# Patient Record
Sex: MALE | Race: ASIAN | Employment: UNEMPLOYED | URBAN - METROPOLITAN AREA
[De-identification: names, ages, dates, MRNs, and addresses within clinical notes are randomized per-mention and may not be internally consistent; named-entity substitution may affect disease eponyms.]

---

## 2023-01-31 ENCOUNTER — LAB (OUTPATIENT)
Dept: FAMILY MEDICINE | Facility: OTHER | Age: 53
End: 2023-01-31

## 2023-01-31 ENCOUNTER — OFFICE VISIT (OUTPATIENT)
Dept: FAMILY MEDICINE | Facility: OTHER | Age: 53
End: 2023-01-31
Payer: COMMERCIAL

## 2023-01-31 VITALS
SYSTOLIC BLOOD PRESSURE: 132 MMHG | HEIGHT: 66 IN | RESPIRATION RATE: 18 BRPM | DIASTOLIC BLOOD PRESSURE: 82 MMHG | HEART RATE: 78 BPM | OXYGEN SATURATION: 97 % | TEMPERATURE: 98.2 F | WEIGHT: 185.5 LBS | BODY MASS INDEX: 29.81 KG/M2

## 2023-01-31 DIAGNOSIS — Z12.5 SCREENING FOR PROSTATE CANCER: ICD-10-CM

## 2023-01-31 DIAGNOSIS — Z12.11 COLON CANCER SCREENING: ICD-10-CM

## 2023-01-31 DIAGNOSIS — B35.4 TINEA CORPORIS: ICD-10-CM

## 2023-01-31 DIAGNOSIS — Z00.00 ROUTINE GENERAL MEDICAL EXAMINATION AT A HEALTH CARE FACILITY: Primary | ICD-10-CM

## 2023-01-31 DIAGNOSIS — L82.1 SEBORRHEIC KERATOSIS: ICD-10-CM

## 2023-01-31 LAB
ALBUMIN SERPL BCG-MCNC: 4.6 G/DL (ref 3.5–5.2)
ALP SERPL-CCNC: 80 U/L (ref 40–129)
ALT SERPL W P-5'-P-CCNC: 12 U/L (ref 10–50)
ANION GAP SERPL CALCULATED.3IONS-SCNC: 7 MMOL/L (ref 7–15)
AST SERPL W P-5'-P-CCNC: 32 U/L (ref 10–50)
BASOPHILS # BLD AUTO: 0 10E3/UL (ref 0–0.2)
BASOPHILS NFR BLD AUTO: 0 %
BILIRUB SERPL-MCNC: 0.4 MG/DL
BUN SERPL-MCNC: 11.4 MG/DL (ref 6–20)
CALCIUM SERPL-MCNC: 9.6 MG/DL (ref 8.6–10)
CHLORIDE SERPL-SCNC: 101 MMOL/L (ref 98–107)
CHOLEST SERPL-MCNC: 262 MG/DL
CREAT SERPL-MCNC: 0.92 MG/DL (ref 0.67–1.17)
DEPRECATED HCO3 PLAS-SCNC: 31 MMOL/L (ref 22–29)
EOSINOPHIL # BLD AUTO: 0.1 10E3/UL (ref 0–0.7)
EOSINOPHIL NFR BLD AUTO: 1 %
ERYTHROCYTE [DISTWIDTH] IN BLOOD BY AUTOMATED COUNT: 13 % (ref 10–15)
GFR SERPL CREATININE-BSD FRML MDRD: >90 ML/MIN/1.73M2
GLUCOSE SERPL-MCNC: 113 MG/DL (ref 70–99)
HCT VFR BLD AUTO: 42.2 % (ref 40–53)
HDLC SERPL-MCNC: 80 MG/DL
HGB BLD-MCNC: 13.8 G/DL (ref 13.3–17.7)
LDLC SERPL CALC-MCNC: 160 MG/DL
LYMPHOCYTES # BLD AUTO: 1.1 10E3/UL (ref 0.8–5.3)
LYMPHOCYTES NFR BLD AUTO: 25 %
MCH RBC QN AUTO: 30.9 PG (ref 26.5–33)
MCHC RBC AUTO-ENTMCNC: 32.7 G/DL (ref 31.5–36.5)
MCV RBC AUTO: 95 FL (ref 78–100)
MONOCYTES # BLD AUTO: 0.5 10E3/UL (ref 0–1.3)
MONOCYTES NFR BLD AUTO: 11 %
NEUTROPHILS # BLD AUTO: 2.8 10E3/UL (ref 1.6–8.3)
NEUTROPHILS NFR BLD AUTO: 63 %
NONHDLC SERPL-MCNC: 182 MG/DL
PLATELET # BLD AUTO: 226 10E3/UL (ref 150–450)
POTASSIUM SERPL-SCNC: 4 MMOL/L (ref 3.4–5.3)
PROT SERPL-MCNC: 8.1 G/DL (ref 6.4–8.3)
PSA SERPL-MCNC: 0.73 NG/ML (ref 0–3.5)
RBC # BLD AUTO: 4.46 10E6/UL (ref 4.4–5.9)
SODIUM SERPL-SCNC: 139 MMOL/L (ref 136–145)
TRIGL SERPL-MCNC: 109 MG/DL
WBC # BLD AUTO: 4.5 10E3/UL (ref 4–11)

## 2023-01-31 PROCEDURE — 80053 COMPREHEN METABOLIC PANEL: CPT | Performed by: PHYSICIAN ASSISTANT

## 2023-01-31 PROCEDURE — G0103 PSA SCREENING: HCPCS | Performed by: PHYSICIAN ASSISTANT

## 2023-01-31 PROCEDURE — 99386 PREV VISIT NEW AGE 40-64: CPT | Performed by: PHYSICIAN ASSISTANT

## 2023-01-31 PROCEDURE — 85025 COMPLETE CBC W/AUTO DIFF WBC: CPT | Performed by: PHYSICIAN ASSISTANT

## 2023-01-31 PROCEDURE — 80061 LIPID PANEL: CPT | Performed by: PHYSICIAN ASSISTANT

## 2023-01-31 PROCEDURE — 36415 COLL VENOUS BLD VENIPUNCTURE: CPT | Performed by: PHYSICIAN ASSISTANT

## 2023-01-31 RX ORDER — CLOTRIMAZOLE 1 %
CREAM (GRAM) TOPICAL 2 TIMES DAILY
Qty: 60 G | Refills: 0 | Status: SHIPPED | OUTPATIENT
Start: 2023-01-31

## 2023-01-31 ASSESSMENT — ENCOUNTER SYMPTOMS
SHORTNESS OF BREATH: 1
WEAKNESS: 1
HEARTBURN: 0
DIZZINESS: 0
EYE PAIN: 0
ABDOMINAL PAIN: 0
ARTHRALGIAS: 0
DYSURIA: 0
MYALGIAS: 0
JOINT SWELLING: 0
HEMATURIA: 0
CONSTIPATION: 0
COUGH: 0
FEVER: 0
FREQUENCY: 0
SORE THROAT: 0
HEADACHES: 0
PALPITATIONS: 0
NAUSEA: 0
DIARRHEA: 0
NERVOUS/ANXIOUS: 0
PARESTHESIAS: 0
HEMATOCHEZIA: 0
CHILLS: 0

## 2023-01-31 ASSESSMENT — PAIN SCALES - GENERAL: PAINLEVEL: NO PAIN (0)

## 2023-01-31 NOTE — PROGRESS NOTES
SUBJECTIVE:   CC: rTish is an 52 year old who presents for preventative health visit.     Patient has been advised of split billing requirements and indicates understanding: Yes  Healthy Habits:     Getting at least 3 servings of Calcium per day:  Yes    Bi-annual eye exam:  NO    Dental care twice a year:  Yes    Sleep apnea or symptoms of sleep apnea:  None    Diet:  Other    Frequency of exercise:  2-3 days/week    Duration of exercise:  30-45 minutes    Taking medications regularly:  Yes    Medication side effects:  None    PHQ-2 Total Score: 0    Additional concerns today:  No    Vision is good, but difficult with reading  Hearing is doing well   Diet is health oriented, balanced     Today's PHQ-2 Score:   PHQ-2 ( 1999 [employer]) 1/31/2023   Q1: Little interest or pleasure in doing things 0   Q2: Feeling down, depressed or hopeless 0   PHQ-2 Score 0   Q1: Little interest or pleasure in doing things Not at all   Q2: Feeling down, depressed or hopeless Not at all   PHQ-2 Score 0     Social History     Tobacco Use     Smoking status: Not on file     Smokeless tobacco: Not on file   Substance Use Topics     Alcohol use: Not on file     If you drink alcohol do you typically have >3 drinks per day or >7 drinks per week? Not applicable    Alcohol Use 1/31/2023   Prescreen: >3 drinks/day or >7 drinks/week? Not Applicable     Last PSA: No results found for: PSA    Reviewed orders with patient. Reviewed health maintenance and updated orders accordingly - Yes      Reviewed and updated as needed this visit by clinical staff                  Reviewed and updated as needed this visit by Provider                     Review of Systems   Constitutional: Negative for chills and fever.   HENT: Negative for congestion, ear pain, hearing loss and sore throat.    Eyes: Negative for pain and visual disturbance.   Respiratory: Positive for shortness of breath. Negative for cough.    Cardiovascular: Negative for chest pain,  "palpitations and peripheral edema.   Gastrointestinal: Negative for abdominal pain, constipation, diarrhea, heartburn, hematochezia and nausea.   Genitourinary: Negative for dysuria, frequency, genital sores, hematuria, impotence, penile discharge and urgency.   Musculoskeletal: Negative for arthralgias, joint swelling and myalgias.   Skin: Negative for rash.   Neurological: Positive for weakness. Negative for dizziness, headaches and paresthesias.   Psychiatric/Behavioral: Negative for mood changes. The patient is not nervous/anxious.      OBJECTIVE:   /82 (Cuff Size: Adult Regular)   Pulse 78   Temp 98.2  F (36.8  C) (Temporal)   Resp 18   Ht 1.683 m (5' 6.25\")   Wt 84.1 kg (185 lb 8 oz)   SpO2 97%   BMI 29.71 kg/m      Physical Exam  GENERAL: healthy, alert and no distress  EYES: Eyes grossly normal to inspection, PERRL and conjunctivae and sclerae normal  HENT: ear canals and TM's normal, nose and mouth without ulcers or lesions  NECK: no adenopathy, no asymmetry, masses, or scars and thyroid normal to palpation  RESP: lungs clear to auscultation - no rales, rhonchi or wheezes  CV: regular rate and rhythm, normal S1 S2, no S3 or S4, no murmur, click or rub, no peripheral edema and peripheral pulses strong  ABDOMEN: soft, nontender, no hepatosplenomegaly, no masses and bowel sounds normal  MS: no gross musculoskeletal defects noted, no edema  SKIN: small seborrheic keratosis along the left dorsal forearm, area of tinea infection along the left upper forearm  PSYCH: mentation appears normal, affect normal/bright    Diagnostic Test Results:  Results for orders placed or performed in visit on 01/31/23   PSA, screen     Status: Normal   Result Value Ref Range    Prostate Specific Antigen Screen 0.73 0.00 - 3.50 ng/mL    Narrative    This result is obtained using the Roche Elecsys total PSA method on the caridad e601 immunoassay analyzer. Results obtained with different assay methods or kits cannot be used " interchangeably.   Lipid Profile (Chol, Trig, HDL, LDL calc)     Status: Abnormal   Result Value Ref Range    Cholesterol 262 (H) <200 mg/dL    Triglycerides 109 <150 mg/dL    Direct Measure HDL 80 >=40 mg/dL    LDL Cholesterol Calculated 160 (H) <=100 mg/dL    Non HDL Cholesterol 182 (H) <130 mg/dL    Narrative    Cholesterol  Desirable:  <200 mg/dL    Triglycerides  Normal:  Less than 150 mg/dL  Borderline High:  150-199 mg/dL  High:  200-499 mg/dL  Very High:  Greater than or equal to 500 mg/dL    Direct Measure HDL  Female:  Greater than or equal to 50 mg/dL   Male:  Greater than or equal to 40 mg/dL    LDL Cholesterol  Desirable:  <100mg/dL  Above Desirable:  100-129 mg/dL   Borderline High:  130-159 mg/dL   High:  160-189 mg/dL   Very High:  >= 190 mg/dL    Non HDL Cholesterol  Desirable:  130 mg/dL  Above Desirable:  130-159 mg/dL  Borderline High:  160-189 mg/dL  High:  190-219 mg/dL  Very High:  Greater than or equal to 220 mg/dL   Comprehensive metabolic panel (BMP + Alb, Alk Phos, ALT, AST, Total. Bili, TP)     Status: Abnormal   Result Value Ref Range    Sodium 139 136 - 145 mmol/L    Potassium 4.0 3.4 - 5.3 mmol/L    Chloride 101 98 - 107 mmol/L    Carbon Dioxide (CO2) 31 (H) 22 - 29 mmol/L    Anion Gap 7 7 - 15 mmol/L    Urea Nitrogen 11.4 6.0 - 20.0 mg/dL    Creatinine 0.92 0.67 - 1.17 mg/dL    Calcium 9.6 8.6 - 10.0 mg/dL    Glucose 113 (H) 70 - 99 mg/dL    Alkaline Phosphatase 80 40 - 129 U/L    AST 32 10 - 50 U/L    ALT 12 10 - 50 U/L    Protein Total 8.1 6.4 - 8.3 g/dL    Albumin 4.6 3.5 - 5.2 g/dL    Bilirubin Total 0.4 <=1.2 mg/dL    GFR Estimate >90 >60 mL/min/1.73m2   CBC with platelets and differential     Status: None   Result Value Ref Range    WBC Count 4.5 4.0 - 11.0 10e3/uL    RBC Count 4.46 4.40 - 5.90 10e6/uL    Hemoglobin 13.8 13.3 - 17.7 g/dL    Hematocrit 42.2 40.0 - 53.0 %    MCV 95 78 - 100 fL    MCH 30.9 26.5 - 33.0 pg    MCHC 32.7 31.5 - 36.5 g/dL    RDW 13.0 10.0 - 15.0 %     Platelet Count 226 150 - 450 10e3/uL    % Neutrophils 63 %    % Lymphocytes 25 %    % Monocytes 11 %    % Eosinophils 1 %    % Basophils 0 %    Absolute Neutrophils 2.8 1.6 - 8.3 10e3/uL    Absolute Lymphocytes 1.1 0.8 - 5.3 10e3/uL    Absolute Monocytes 0.5 0.0 - 1.3 10e3/uL    Absolute Eosinophils 0.1 0.0 - 0.7 10e3/uL    Absolute Basophils 0.0 0.0 - 0.2 10e3/uL   CBC with platelets and differential     Status: None    Narrative    The following orders were created for panel order CBC with platelets and differential.  Procedure                               Abnormality         Status                     ---------                               -----------         ------                     CBC with platelets and d...[[id number]]                      Final result                 Please view results for these tests on the individual orders.       ASSESSMENT/PLAN:       ICD-10-CM    1. Routine general medical examination at a health care facility  Z00.00 CBC with platelets and differential     Comprehensive metabolic panel (BMP + Alb, Alk Phos, ALT, AST, Total. Bili, TP)     Lipid Profile (Chol, Trig, HDL, LDL calc)     Lipid Profile (Chol, Trig, HDL, LDL calc)     Comprehensive metabolic panel (BMP + Alb, Alk Phos, ALT, AST, Total. Bili, TP)     CBC with platelets and differential      2. Tinea corporis  B35.4 clotrimazole (LOTRIMIN) 1 % external cream      3. Seborrheic keratosis  L82.1       4. Screening for prostate cancer  Z12.5 PSA, screen     PSA, screen      5. Colon cancer screening  Z12.11 COLOGARNOLD([employer])          Patient has been advised of split billing requirements and indicates understanding: Yes      COUNSELING:   Reviewed preventive health counseling, as reflected in patient instructions       Regular exercise       Healthy diet/nutrition       Colorectal cancer screening    [facility]

## 2023-02-01 ENCOUNTER — TELEPHONE (OUTPATIENT)
Dept: FAMILY MEDICINE | Facility: OTHER | Age: 53
End: 2023-02-01
Payer: COMMERCIAL

## 2023-02-01 NOTE — LETTER
Hello,        Your lipids (cholesterol) levels returned with elevations. Of these, the low density lipoprotein (LDL) was elevated at 160. The ASCVD risk is estimated at 4.0%. This is the estimated risk for stroke and or heart attack over the next ten years based off of your age, gender, habits and lipids (see values below). The normal level is considered to be 5% or less. Recommendations to help keep this risk low include: Maintaining a healthy diet low in salts/sugars/fatty&greasy foods with regular servings of fruits and vegetables. Smoking cessation. Also, try to get in 30 minutes of aerobic exercise 5 or more days each week as able.     The metabolic panel, which evaluates your kidney and liver function, electrolytes and blood sugar, returned grossly normal. In addition, the cell counts did not show any evidence of anemia, infection or other abnormality.     If you have any questions or concerns please let us know.     Thank you,    [facility] Team  [phone]                                                Office Visit on 01/31/2023   Component Date Value Ref Range Status     Prostate Specific Antigen Screen 01/31/2023 0.73  0.00 - 3.50 ng/mL Final     Cholesterol 01/31/2023 262 (H)  <200 mg/dL Final     Triglycerides 01/31/2023 109  <150 mg/dL Final     Direct Measure HDL 01/31/2023 80  >=40 mg/dL Final     LDL Cholesterol Calculated 01/31/2023 160 (H)  <=100 mg/dL Final     Non HDL Cholesterol 01/31/2023 182 (H)  <130 mg/dL Final     Sodium 01/31/2023 139  136 - 145 mmol/L Final     Potassium 01/31/2023 4.0  3.4 - 5.3 mmol/L Final     Chloride 01/31/2023 101  98 - 107 mmol/L Final     Carbon Dioxide (CO2) 01/31/2023 31 (H)  22 - 29 mmol/L Final     Anion Gap 01/31/2023 7  7 - 15 mmol/L Final     Urea Nitrogen 01/31/2023 11.4  6.0 - 20.0 mg/dL Final     Creatinine 01/31/2023 0.92  0.67 - 1.17 mg/dL Final     Calcium 01/31/2023 9.6  8.6 - 10.0 mg/dL Final     Glucose 01/31/2023 113 (H)   70 - 99 mg/dL Final     Alkaline Phosphatase 01/31/2023 80  40 - 129 U/L Final     AST 01/31/2023 32  10 - 50 U/L Final     ALT 01/31/2023 12  10 - 50 U/L Final     Protein Total 01/31/2023 8.1  6.4 - 8.3 g/dL Final     Albumin 01/31/2023 4.6  3.5 - 5.2 g/dL Final     Bilirubin Total 01/31/2023 0.4  <=1.2 mg/dL Final     GFR Estimate 01/31/2023 >90  >60 mL/min/1.73m2 Final    eGFR calculated using 2021 CKD-EPI equation.     WBC Count 01/31/2023 4.5  4.0 - 11.0 10e3/uL Final     RBC Count 01/31/2023 4.46  4.40 - 5.90 10e6/uL Final     Hemoglobin 01/31/2023 13.8  13.3 - 17.7 g/dL Final     Hematocrit 01/31/2023 42.2  40.0 - 53.0 % Final     MCV 01/31/2023 95  78 - 100 fL Final     MCH 01/31/2023 30.9  26.5 - 33.0 pg Final     MCHC 01/31/2023 32.7  31.5 - 36.5 g/dL Final     RDW 01/31/2023 13.0  10.0 - 15.0 % Final     Platelet Count 01/31/2023 226  150 - 450 10e3/uL Final     % Neutrophils 01/31/2023 63  % Final     % Lymphocytes 01/31/2023 25  % Final     % Monocytes 01/31/2023 11  % Final     % Eosinophils 01/31/2023 1  % Final     % Basophils 01/31/2023 0  % Final     Absolute Neutrophils 01/31/2023 2.8  1.6 - 8.3 10e3/uL Final     Absolute Lymphocytes 01/31/2023 1.1  0.8 - 5.3 10e3/uL Final     Absolute Monocytes 01/31/2023 0.5  0.0 - 1.3 10e3/uL Final     Absolute Eosinophils 01/31/2023 0.1  0.0 - 0.7 10e3/uL Final     Absolute Basophils 01/31/2023 0.0  0.0 - 0.2 10e3/uL Final

## 2023-02-01 NOTE — TELEPHONE ENCOUNTER
----- Message from Carroll Fernandez PA-C sent at 1/31/2023  8:58 PM CST -----  Required  during visit.     Please call with results. Please inform patient that his PSA returned within normal range.     His lipids (cholesterol) levels returned with elevations. Of these, the low density lipoprotein (LDL) was elevated at 160. The ASCVD risk is estimated at 4.0%. This is the estimated risk for stroke and or heart attack over the next ten years based off of your age, gender, habits and lipids (see values below). The normal level is considered to be 5% or less. Recommendations to help keep this risk low include: Maintaining a healthy diet low in salts/sugars/fatty&greasy foods with regular servings of fruits and vegetables. Smoking cessation. Also, try to get in 30 minutes of aerobic exercise 5 or more days each week as able.    The metabolic panel, which evaluates your kidney and liver function, electrolytes and blood sugar, returned grossly normal. In addition, the cell counts did not show any evidence of anemia, infection or other abnormality.     Follow up in 1 year for next annual checkup.        Carroll Fernandez PA-C on 1/31/2023 at 8:50 PM

## 2023-02-01 NOTE — TELEPHONE ENCOUNTER
Attempted to reach the patient with the following information.  Left message for patient to return call to clinic.     Fiordaliza Pace MA

## 2023-02-01 NOTE — TELEPHONE ENCOUNTER
Sister calls back. Pt is not with her. There is no consent to communicate on file. Sister asks that you mail results to pt and also put her name.     Moon Howell, ETHELN, RN, PHN  Registered Nurse-Clinic Triage  [facility]  2/1/2023 at 2:04 PM

## 2023-03-10 ENCOUNTER — TELEPHONE (OUTPATIENT)
Dept: FAMILY MEDICINE | Facility: OTHER | Age: 53
End: 2023-03-10
Payer: COMMERCIAL

## 2023-03-10 LAB — NONINV COLON CA DNA+OCC BLD SCRN STL QL: NEGATIVE

## 2023-03-10 NOTE — TELEPHONE ENCOUNTER
----- Message from Carroll Fernandez PA-C sent at 3/10/2023 12:34 PM CST -----  Please send letter:    Dear Sinanlaureen,    The results of your cologuard returned negative. A negative Cologuard result indicates a low likelihood that a colorectal cancer (CRC) or advanced adenoma (adenomatous polyps with more advanced pre-malignant features) is present. The chance that a person with a negative Cologuard test has a colorectal cancer is less than 1 in 1500 (negative predictive value >99.9%) or has an  advanced adenoma is less than  5.3% (negative predictive value 94.7%). Multi-Society Task Force screening guidelines recommend a Cologuard re-screening interval of 3 years.     Carroll Fernandez PA-C

## 2024-01-25 ENCOUNTER — TELEPHONE (OUTPATIENT)
Dept: FAMILY MEDICINE | Facility: OTHER | Age: 54
End: 2024-01-25
Payer: COMMERCIAL

## 2024-01-26 ENCOUNTER — ANCILLARY PROCEDURE (OUTPATIENT)
Dept: GENERAL RADIOLOGY | Facility: CLINIC | Age: 54
End: 2024-01-26
Payer: COMMERCIAL

## 2024-01-26 ENCOUNTER — OFFICE VISIT (OUTPATIENT)
Dept: URGENT CARE | Facility: URGENT CARE | Age: 54
End: 2024-01-26
Payer: COMMERCIAL

## 2024-01-26 VITALS
SYSTOLIC BLOOD PRESSURE: 146 MMHG | HEART RATE: 75 BPM | OXYGEN SATURATION: 97 % | DIASTOLIC BLOOD PRESSURE: 84 MMHG | TEMPERATURE: 97 F

## 2024-01-26 DIAGNOSIS — M25.512 ACUTE PAIN OF LEFT SHOULDER: ICD-10-CM

## 2024-01-26 DIAGNOSIS — S43.402A SPRAIN OF LEFT SHOULDER, UNSPECIFIED SHOULDER SPRAIN TYPE, INITIAL ENCOUNTER: Primary | ICD-10-CM

## 2024-01-26 PROCEDURE — 99203 OFFICE O/P NEW LOW 30 MIN: CPT

## 2024-01-26 PROCEDURE — 73030 X-RAY EXAM OF SHOULDER: CPT | Mod: TC | Performed by: RADIOLOGY

## 2024-01-26 ASSESSMENT — ENCOUNTER SYMPTOMS
RESPIRATORY NEGATIVE: 1
GASTROINTESTINAL NEGATIVE: 1
ARTHRALGIAS: 1
CONSTITUTIONAL NEGATIVE: 1
CARDIOVASCULAR NEGATIVE: 1
PSYCHIATRIC NEGATIVE: 1
NEUROLOGICAL NEGATIVE: 1

## 2024-01-26 NOTE — TELEPHONE ENCOUNTER
Reason for Call:  Appointment Request- New patient looking to see if someone could see him regarding a shoulder injury. A few months he injured it pulling cord on  and it has not got better. Did not want triage. Cousin Jack is calling for him, and she is not with the patient right now. Needs a [facility] . Can a provider fit him in at [facility]?  Patient last seen 1/31/23  Patient requesting this type of appt:  Office Visit for Shoulder injury     Requested provider:  Any [facility] provider     Reason patient unable to be scheduled: Not within requested timeframe    When does patient want to be seen/preferred time: Same day or asap     Comments: None     Okay to leave a detailed message?: Yes at Other phone number:  Call patients colemansin Jack, she speaks English Her phone number is [phone]. Can leave a message. Thank you    Call taken on 1/25/2024 at 9:07 PM by Suha Martinez

## 2024-01-26 NOTE — TELEPHONE ENCOUNTER
Attempted to call patient with .  Unable to leave message on  due to busy signal.  No C2C on file for patient's cousin.

## 2024-01-27 NOTE — PROGRESS NOTES
Patient presents with:  Urgent Care  Shoulder Pain: X's 2 months left shoulder pain       Clinical Decision Makin-year-old male, well, nontoxic, nonseptic appearing presenting with cousin.  Poorly localized left shoulder pain x 2 months which started after trying to pull a lawnmower to start.  Has tried conservative measures at home including topical ointments and quarter rubbing/massage, without relief.  Exam is reassuringly without evidence of joint instability or deformity, no point tenderness, no obvious swelling or effusion. Remarkable for positive painful arc sign, positive Neer's. Shoulder sprain vs rotator cuff injury.      X-ray left shoulder reassuringly unremarkable.    Recommended trying over-the-counter NSAIDs such as ibuprofen.  Patient expresses he prefers not to take pills.  Therefore, recommended topical Voltaren.  Also recommended follow-up with sports medicine as left shoulder pain is persisting despite conservative measures he has tried at home.    At the end of the encounter, I discussed results, diagnosis, medications. Discussed red flags for immediate return to clinic/ER, as well as indications for follow up if no improvement. Patient understood and agreed to plan. Patient was stable for discharge.    ICD-10-CM    1. Sprain of left shoulder, unspecified shoulder sprain type, initial encounter  S43.402A diclofenac (VOLTAREN) 1 % topical gel      2. Acute pain of left shoulder  M25.512 XR Shoulder Left G/E 3 Views     Orthopedic  Referral          Patient Instructions   X-ray of left shoulder is negative for fracture.    I recommend you try over-the-counter ibuprofen as needed to help decrease inflammation, and to help with discomfort.    Ibuprofen dosin mg to 400 mg every 4-6 hours as needed, or 600 mg to 800 mg every 6-8 hours as needed. Do not consume more than 3200 mg within a 24 hour period.     If you do not want to take something by mouth, you can try the prescription  I sent to the pharmacy.  Voltaren gel.  Apply 4 times daily as needed to the left shoulder region.    As this has been bothering you for 2 months now, not relieved by conservative measures you have tried at home, I do recommend he follow-up with sports medicine and I have placed a referral for this.  They should call you to set up an appointment.  If they do not call you, you can call the number listed.    Please monitor for fevers/chills, weakness, numbness/tingling, chest pain, sweating, GI upset such as vomiting -Go to emergency room if you are experiencing any of these symptoms.      HPI:  Yvonne Day is a 53 year old male who is presenting with cousin. Without significant pmh, who presents today with:  Left shoulder pain x 2 months after trying to pull  on.   Denies numbness/tingling in extremity.  Denies left arm weakness.  Feels pain is worsening.   Has tried ointments, massage, quarter massage, bengay, without improvement.  Has not tried ibuprofen or tylenol.  His cousin reports he does not like taking pills.  No fevers or chills.   No N/V.  Denies chest pain.  Denies shortness of breath.  Otherwise feeling okay.     History obtained from the patient.    Problem List:  There are no relevant problems documented for this patient.      No past medical history on file.    Social History     Tobacco Use    Smoking status: Not on file    Smokeless tobacco: Not on file   Substance Use Topics    Alcohol use: Not on file       Review of Systems   Constitutional: Negative.    HENT: Negative.     Respiratory: Negative.     Cardiovascular: Negative.    Gastrointestinal: Negative.    Musculoskeletal:  Positive for arthralgias.        Shoulder pain, left side.   Neurological: Negative.    Psychiatric/Behavioral: Negative.         Vitals:    01/26/24 1902   BP: (!) 146/84   Pulse: 75   Temp: 97  F (36.1  C)   TempSrc: Temporal   SpO2: 97%       Physical Exam  Constitutional:       General: He is not in  acute distress.     Appearance: Normal appearance.   HENT:      Head: Normocephalic and atraumatic.      Right Ear: External ear normal.      Left Ear: External ear normal.   Eyes:      Conjunctiva/sclera: Conjunctivae normal.   Cardiovascular:      Rate and Rhythm: Normal rate and regular rhythm.      Heart sounds: Normal heart sounds. No murmur heard.     No friction rub. No gallop.   Pulmonary:      Effort: Pulmonary effort is normal. No respiratory distress.      Breath sounds: Normal breath sounds. No stridor. No wheezing, rhonchi or rales.   Chest:      Chest wall: No tenderness.   Musculoskeletal:         General: No swelling, deformity or signs of injury.      Right shoulder: Normal.        Arms:       Right lower leg: No edema.      Left lower leg: No edema.      Comments: Linear ecchymosis, quarter rubbing markings.  Negative empty can.  Seems to have more discomfort with abduction and adduction.  Positive painful arc sign -left side.  Positive Neer's.  Negative Ray Grant.  Negative painful drop arm sign.   Skin:     General: Skin is warm.      Capillary Refill: Capillary refill takes less than 2 seconds.      Comments: Quarter rubbing marks noted on left upper back -linear, ecchymosis marks.    Neurological:      General: No focal deficit present.      Mental Status: He is alert and oriented to person, place, and time.   Psychiatric:         Mood and Affect: Mood normal.         Behavior: Behavior normal.         Thought Content: Thought content normal.         Judgment: Judgment normal.         Results:  Results for orders placed or performed in visit on 01/26/24   XR Shoulder Left G/E 3 Views     Status: None    Narrative    EXAM: XR SHOULDER LEFT G/E 3 VIEWS  LOCATION: [facility]  DATE: 1/26/2024    INDICATION: left shoulder pain x 2 months after a pulling mechanism injury   pulling lawnmower on. No point tenderness.  COMPARISON: None.      Impression    IMPRESSION: Normal  joint spaces and alignment. No fracture.

## 2024-01-27 NOTE — PATIENT INSTRUCTIONS
X-ray of left shoulder is negative for fracture.    I recommend you try over-the-counter ibuprofen as needed to help decrease inflammation, and to help with discomfort.    Ibuprofen dosin mg to 400 mg every 4-6 hours as needed, or 600 mg to 800 mg every 6-8 hours as needed. Do not consume more than 3200 mg within a 24 hour period.     If you do not want to take something by mouth, you can try the prescription I sent to the pharmacy.  Voltaren gel.  Apply 4 times daily as needed to the left shoulder region.    As this has been bothering you for 2 months now, not relieved by conservative measures you have tried at home, I do recommend he follow-up with sports medicine and I have placed a referral for this.  They should call you to set up an appointment.  If they do not call you, you can call the number listed.    Please monitor for fevers/chills, weakness, numbness/tingling, chest pain, sweating, GI upset such as vomiting -Go to emergency room if you are experiencing any of these symptoms.

## 2024-02-09 NOTE — PROGRESS NOTES
Yvonne Day  :  1970  DOS: 2024  MRN: [id number]  PCP: No Ref-Primary, Physician    Sports Medicine Clinic Visit    HPI  Yvonne Day is a 53 year old male who is seen in consultation at the request of  Cathleen Rubio C.N.P. presenting with left shoulder pain.    - Mechanism of Injury:  2+ months ago, pulling injury from starting a   - Prior evaluation:    - UC visit on 24. Suspected RTC injury vs shoulder sprain. Recommended OTC NSAIDs, Voltaren, Sports Med follow up.   - XR L Shoulder shows normal anatomic alignment, no fractures or dislocations.     - Pain Character:  Pain has been present for  3 months .  Pain is well localized to the left shoulder and left scapula without significant radiation down the arm  - Endorses:  pain in the periscapular musculature as well as the lateral shoulder with particular motions, swelling in the periscapular musculature and trapezius  - Denies:  clicking, popping, grinding, instability, numbness, tingling, weakness, fever, chills, nausea, vomiting  - Alleviating factors:  voltaren and biofreeze for short term relief  - Aggravating factors:   lying on his back or left shoulder ; using shoulder away from body  - Treatments tried:  topicals and massage    - Patient Goals:  discuss treatment options      Review of Systems  Musculoskeletal: as above  Remainder of review of systems is negative including constitutional, CV, pulmonary, GI, Skin and Neurologic except as noted in HPI or medical history.    No past medical history on file.  No past surgical history on file.  No family history on file.      Objective  Wt 84.1 kg (185 lb 8 oz)   BMI 29.71 kg/m      General: healthy, alert and in no acute distress.    HEENT: no scleral icterus or conjunctival erythema.   Skin: no suspicious lesions or rash. No jaundice.   CV: regular rhythm by palpation, 2+ distal pulses.  Resp: normal respiratory effort without conversational dyspnea.    Psych: normal mood and affect.    Gait: nonantalgic, appropriate coordination and balance.     Neuro:        - Sensation to light touch:    - Intact throughout the BUE including all peripheral nerve distributions.        - MSR:       RUE  LUE  - Biceps  2+ 2+  - Brachioradialis 2+ 2+  - Triceps  2+ 2+       - Special tests:   - Spurling's:  Neg bilaterally    MSK - Shoulder:       - Inspection:    - No significant swelling, erythema, warmth, ecchymosis, lesion, or atrophy noted.        - ROM:    - Full AROM/PROM with pain during shoulder abduction, flexion, IR/ER.        - Palpation:    - TTP at the subacromial space, posterior shoulder notch, upper trapezius, infraspinatus.  - NTTP elsewhere.        - Strength:  (*antalgic)  RUE LUE  - Shoulder Abduction   5 5*   - Shoulder Flexion   5 5   - Shoulder Internal Rotation  5 5*   - Shoulder External Rotation  5 5*  - Elbow Flexion   5 5  - Elbow Extension   5 5  - Forearm Pronation   5 5  - Forearm Supination   5 5  - Wrist Extension   5 5  - Wrist Flexion    5 5  - FDI     5 5  - ADM     5 5  - FPL     5 5  - APB     5 5  - EIP     5 5  - EDC     5 5  - APL/EPB    5 5           - Special tests:        - Ray: Positive   - Neers: Positive   - Empty can: Positive    - [place]:  Neg    - Scarf:  Neg    - Speeds:  Neg    - Yergason:  Neg     Radiology  I independently reviewed the available relevant imaging, with the following interpretation:   -XR with interpretation as above in HPI      Recent Results (from the past 744 hour(s))   XR Shoulder Left G/E 3 Views    Narrative    EXAM: XR SHOULDER LEFT G/E 3 VIEWS  LOCATION: [facility]  DATE: 1/26/2024    INDICATION: left shoulder pain x 2 months after a pulling mechanism injury   pulling lawnmower on. No point tenderness.  COMPARISON: None.      Impression    IMPRESSION: Normal joint spaces and alignment. No fracture.       Assessment  1. Traumatic incomplete tear of left rotator cuff, initial  encounter        Plan  Yvonne Day is a 53 year old male that presents with chronic left shoulder pain for the past few months after an injury while pull starting a .  Pain has been in the left posterior shoulder/infraspinatus as well as periscapular musculature.  He feels pain with particular lifts and shoulder motions as well as when he is laying on his back.  History and physical exam appear most consistent with a mild to moderate traumatic incomplete rotator cuff tear, most likely in the infraspinatus with myofascial pain in the periscapular musculature.     Discussed the nature of the condition and treatment options and mutually agreed upon the following plan:    - Imaging:          - Reviewed relevant imaging in the chart.  - Reviewed results and images with patient.   - Medications:          - Discussed pharmacologic options for pain relief.   - May use NSAIDs (Ibuprofen, Naproxen) or Acetaminophen (Tylenol) as needed for pain control.   - May also use topical medications such as lidocaine, IcyHot, BioFreeze, or Voltaren gel as needed for pain control.  Added a prescription for Voltaren gel per patient request.  - Injections:          - Discussed possible injection options and alternatives.    - Options include corticosteroid injection of the subacromial/subdeltoid bursa, or could consider trigger point injections in the future.    -Stated that he would like a subacromial injection today, injection was prepared but then he stated that he would prefer the injection be done under ultrasound guidance, which is reasonable.  We coordinated a time later this week to be able to perform the injection with ultrasound guidance at one of our ultrasound capable clinics.  - Therapy:          - Discussed the benefits of physical therapy vs home exercise program for optimization of range of motion, flexibility, strength, stability and function.   - Preference is for physical therapy.   - Physical Therapy  referral placed today and instructed to call [phone] to schedule appointments.   - Modalities:          - May use ice, heat, massage or other modalities as needed.   - Activity:          - Encouraged to remain active and participate in regular activities as symptoms allow.    - Follow up:          - On Friday for an ultrasound-guided left subacromial bursa injection.  - Patient has clinic contact information for questions or concerns.       Carroll Cullen DO, CAQSM  [facility] - Sports Medicine  [facility] - Department of Orthopedic Surgery       Disclaimer:  This note was prepared and written using Dragon Medical dictation software. As a result, there may be errors in the script that have gone undetected. Please consider this when interpreting the information in this note.

## 2024-02-13 ENCOUNTER — OFFICE VISIT (OUTPATIENT)
Dept: ORTHOPEDICS | Facility: OTHER | Age: 54
End: 2024-02-13
Payer: COMMERCIAL

## 2024-02-13 VITALS — BODY MASS INDEX: 29.71 KG/M2 | WEIGHT: 185.5 LBS

## 2024-02-13 DIAGNOSIS — S46.012A TRAUMATIC INCOMPLETE TEAR OF LEFT ROTATOR CUFF, INITIAL ENCOUNTER: Primary | ICD-10-CM

## 2024-02-13 PROCEDURE — 99204 OFFICE O/P NEW MOD 45 MIN: CPT | Performed by: STUDENT IN AN ORGANIZED HEALTH CARE EDUCATION/TRAINING PROGRAM

## 2024-02-13 ASSESSMENT — PAIN SCALES - GENERAL: PAINLEVEL: SEVERE PAIN (6)

## 2024-02-13 NOTE — LETTER
2024         RE: Yvonne Day  [address] [facility] [address]        Dear Colleague,    Thank you for referring your patient, Yvonne Day, to the [facility] SPORTS MEDICINE CLINIC [place]. Please see a copy of my visit note below.    Yvonne Day  :  1970  DOS: 2024  MRN: [id number]  PCP: No Ref-Primary, Physician    Sports Medicine Clinic Visit    HPI  Yvonne Day is a 53 year old male who is seen in consultation at the request of  Cathleen Rubio C.N.P. presenting with left shoulder pain.    - Mechanism of Injury:  2+ months ago, pulling injury from starting a   - Prior evaluation:    - UC visit on 24. Suspected RTC injury vs shoulder sprain. Recommended OTC NSAIDs, Voltaren, Sports Med follow up.   - XR L Shoulder shows normal anatomic alignment, no fractures or dislocations.     - Pain Character:  Pain has been present for  3 months .  Pain is well localized to the left shoulder and left scapula without significant radiation down the arm  - Endorses:  pain in the periscapular musculature as well as the lateral shoulder with particular motions, swelling in the periscapular musculature and trapezius  - Denies:  clicking, popping, grinding, instability, numbness, tingling, weakness, fever, chills, nausea, vomiting  - Alleviating factors:  voltaren and biofreeze for short term relief  - Aggravating factors:   lying on his back or left shoulder ; using shoulder away from body  - Treatments tried:  topicals and massage    - Patient Goals:  discuss treatment options      Review of Systems  Musculoskeletal: as above  Remainder of review of systems is negative including constitutional, CV, pulmonary, GI, Skin and Neurologic except as noted in HPI or medical history.    No past medical history on file.  No past surgical history on file.  No family history on file.      Objective  Wt 84.1 kg (185 lb 8 oz)   BMI 29.71 kg/m       General: healthy, alert and in no acute distress.    HEENT: no scleral icterus or conjunctival erythema.   Skin: no suspicious lesions or rash. No jaundice.   CV: regular rhythm by palpation, 2+ distal pulses.  Resp: normal respiratory effort without conversational dyspnea.   Psych: normal mood and affect.    Gait: nonantalgic, appropriate coordination and balance.     Neuro:        - Sensation to light touch:    - Intact throughout the BUE including all peripheral nerve distributions.        - MSR:       RUE  LUE  - Biceps  2+ 2+  - Brachioradialis 2+ 2+  - Triceps  2+ 2+       - Special tests:   - Spurling's:  Neg bilaterally    MSK - Shoulder:       - Inspection:    - No significant swelling, erythema, warmth, ecchymosis, lesion, or atrophy noted.        - ROM:    - Full AROM/PROM with pain during shoulder abduction, flexion, IR/ER.        - Palpation:    - TTP at the subacromial space, posterior shoulder notch, upper trapezius, infraspinatus.  - NTTP elsewhere.        - Strength:  (*antalgic)  RUE LUE  - Shoulder Abduction   5 5*   - Shoulder Flexion   5 5   - Shoulder Internal Rotation  5 5*   - Shoulder External Rotation  5 5*  - Elbow Flexion   5 5  - Elbow Extension   5 5  - Forearm Pronation   5 5  - Forearm Supination   5 5  - Wrist Extension   5 5  - Wrist Flexion    5 5  - FDI     5 5  - ADM     5 5  - FPL     5 5  - APB     5 5  - EIP     5 5  - EDC     5 5  - APL/EPB    5 5           - Special tests:        - Ray: Positive   - Neers: Positive   - Empty can: Positive    - Scott:  Neg    - Scarf:  Neg    - Speeds:  Neg    - Yergason:  Neg     Radiology  I independently reviewed the available relevant imaging, with the following interpretation:   -XR with interpretation as above in HPI      Recent Results (from the past 744 hour(s))   XR Shoulder Left G/E 3 Views    Narrative    EXAM: XR SHOULDER LEFT G/E 3 VIEWS  LOCATION: [facility]  DATE: 1/26/2024    INDICATION: left  shoulder pain x 2 months after a pulling mechanism injury   pulling lawnmower on. No point tenderness.  COMPARISON: None.      Impression    IMPRESSION: Normal joint spaces and alignment. No fracture.       Assessment  1. Traumatic incomplete tear of left rotator cuff, initial encounter        Plan  Yvonne Day is a 53 year old male that presents with chronic left shoulder pain for the past few months after an injury while pull starting a .  Pain has been in the left posterior shoulder/infraspinatus as well as periscapular musculature.  He feels pain with particular lifts and shoulder motions as well as when he is laying on his back.  History and physical exam appear most consistent with a mild to moderate traumatic incomplete rotator cuff tear, most likely in the infraspinatus with myofascial pain in the periscapular musculature.     Discussed the nature of the condition and treatment options and mutually agreed upon the following plan:    - Imaging:          - Reviewed relevant imaging in the chart.  - Reviewed results and images with patient.   - Medications:          - Discussed pharmacologic options for pain relief.   - May use NSAIDs (Ibuprofen, Naproxen) or Acetaminophen (Tylenol) as needed for pain control.   - May also use topical medications such as lidocaine, IcyHot, BioFreeze, or Voltaren gel as needed for pain control.  Added a prescription for Voltaren gel per patient request.  - Injections:          - Discussed possible injection options and alternatives.    - Options include corticosteroid injection of the subacromial/subdeltoid bursa, or could consider trigger point injections in the future.    -Stated that he would like a subacromial injection today, injection was prepared but then he stated that he would prefer the injection be done under ultrasound guidance, which is reasonable.  We coordinated a time later this week to be able to perform the injection with ultrasound guidance  at one of our ultrasound capable clinics.  - Therapy:          - Discussed the benefits of physical therapy vs home exercise program for optimization of range of motion, flexibility, strength, stability and function.   - Preference is for physical therapy.   - Physical Therapy referral placed today and instructed to call [phone] to schedule appointments.   - Modalities:          - May use ice, heat, massage or other modalities as needed.   - Activity:          - Encouraged to remain active and participate in regular activities as symptoms allow.    - Follow up:          - On Friday for an ultrasound-guided left subacromial bursa injection.  - Patient has clinic contact information for questions or concerns.       Carroll Cullen DO, CAQSM  [facility] Sports Medicine  [facility] - Department of Orthopedic Surgery       Disclaimer:  This note was prepared and written using Dragon Medical dictation software. As a result, there may be errors in the script that have gone undetected. Please consider this when interpreting the information in this note.       Again, thank you for allowing me to participate in the care of your patient.        Sincerely,        Carroll Cullen DO

## 2024-02-15 NOTE — PROGRESS NOTES
Phrasupap Muangprom  :  1970  DOS: 2024  MRN: [id number]    Sports Medicine Clinic Procedure    Ultrasound Guided Left Subacromial Injection    Clinical History: Traumatic incomplete tear of the left rotator cuff    Diagnosis: Traumatic incomplete tear of the left rotator cuff    Large Joint Injection/Arthocentesis: L subacromial bursa    Date/Time: 2024 8:13 AM    Performed by: Carroll Cullen DO  Authorized by: Carroll Cullen DO    Indications:  Pain  Needle Size:  22 G  Guidance: ultrasound    Approach:  Anterolateral  Location:  Shoulder      Site:  L subacromial bursa  Medications:  40 mg triamcinolone 40 MG/ML; 2 mL lidocaine 1 %; 2 mL BUPivacaine (PF) 0.5 %  Outcome:  Tolerated well, no immediate complications  Procedure discussed: discussed risks, benefits, and alternatives    Consent Given by:  Patient  Prep: patient was prepped and draped in usual sterile fashion     Ultrasound images of procedure were permanently stored.       Technique: The risks of the procedure were explained to the patient.  A consent was signed for the subacromial injection.  The patient was evaluated with a [employer] ultrasound machine using a 12 MHz linear probe.     Procedure   Subacromial Bursa - Ultrasound Guided  The patient was informed of the risks and the benefits of the procedure and a written consent was signed.  The patient s left shoulder was prepped with chlorhexidine in sterile fashion.   An injectate solution containing 2 mL of 1% lidocaine, 2 mL of 0.5% Bupivacaine, and 1 mL of Kenalog (40 mg/mL) was drawn up into a 5 mL syringe.  Injection was performed using sterile technique.  Under ultrasound guidance a 1.5-inch 22-gauge needle was used to enter the subacromial bursa.  An anterolateral approach was used with arm held in Crass position.  Needle placement was visualized and documented with ultrasound.  Ultrasound visualization was necessary to ensure placement in to the bursa and not the  rotator cuff tendon which could potentially cause further tendon damage.  Injection performed in-plane.  Injection solution visualized within the joint space.  Images were permanently stored for the patient's record.  There were no complications. The patient tolerated the procedure well. There was negligible bleeding.       Impression:  Successful ultrasound guided left subacromial bursa injection.    Plan:  - Injection:    - Expectations and goals of the injection were discussed and verbal and written consent was obtained.  - Performed a corticosteroid injection of the left subacromial bursa today in clinic. Patient tolerated the procedure well without complications.    - Post-procedure instructions:    - Keep the injection site clean and dry.   - Do not submerge the injection site for 24 hours (no baths, pools). Showers are ok.   - Rest the area for 24-48 hours before resuming normal activities. Avoid overexerting the area for the first few weeks.   - It may take 2-3 days to start noticing the effects of the injection and up to 3-4 weeks to feel significant benefits.   - Follow up:          - In 3 months as needed for updates to treatment plan, or sooner for new/worsening symptoms.  - Patient has clinic contact information for questions or concerns.      Carroll Cullen DO, RODRÍGUEZM  [facility] - Sports Medicine  [facility] - Department of Orthopedic Surgery     Disclaimer:  This note was prepared and written using Dragon Medical dictation software. As a result, there may be errors in the script that have gone undetected. Please consider this when interpreting the information in this note.

## 2024-02-16 ENCOUNTER — OFFICE VISIT (OUTPATIENT)
Dept: ORTHOPEDICS | Facility: CLINIC | Age: 54
End: 2024-02-16
Payer: COMMERCIAL

## 2024-02-16 DIAGNOSIS — S46.012A TRAUMATIC INCOMPLETE TEAR OF LEFT ROTATOR CUFF, INITIAL ENCOUNTER: Primary | ICD-10-CM

## 2024-02-16 PROCEDURE — 20611 DRAIN/INJ JOINT/BURSA W/US: CPT | Mod: LT | Performed by: STUDENT IN AN ORGANIZED HEALTH CARE EDUCATION/TRAINING PROGRAM

## 2024-02-16 PROCEDURE — 99207 PR DROP WITH A PROCEDURE: CPT | Mod: 25 | Performed by: STUDENT IN AN ORGANIZED HEALTH CARE EDUCATION/TRAINING PROGRAM

## 2024-02-16 RX ORDER — LIDOCAINE HYDROCHLORIDE 10 MG/ML
2 INJECTION, SOLUTION INFILTRATION; PERINEURAL
Status: SHIPPED | OUTPATIENT
Start: 2024-02-16

## 2024-02-16 RX ORDER — TRIAMCINOLONE ACETONIDE 40 MG/ML
40 INJECTION, SUSPENSION INTRA-ARTICULAR; INTRAMUSCULAR
Status: SHIPPED | OUTPATIENT
Start: 2024-02-16

## 2024-02-16 RX ORDER — BUPIVACAINE HYDROCHLORIDE 5 MG/ML
2 INJECTION, SOLUTION EPIDURAL; INTRACAUDAL
Status: SHIPPED | OUTPATIENT
Start: 2024-02-16

## 2024-02-16 RX ADMIN — TRIAMCINOLONE ACETONIDE 40 MG: 40 INJECTION, SUSPENSION INTRA-ARTICULAR; INTRAMUSCULAR at 08:13

## 2024-02-16 RX ADMIN — LIDOCAINE HYDROCHLORIDE 2 ML: 10 INJECTION, SOLUTION INFILTRATION; PERINEURAL at 08:13

## 2024-02-16 RX ADMIN — BUPIVACAINE HYDROCHLORIDE 2 ML: 5 INJECTION, SOLUTION EPIDURAL; INTRACAUDAL at 08:13

## 2024-02-16 NOTE — LETTER
2024         RE: Yvonne Day  [address] [facility] [address]        Dear Colleague,    Thank you for referring your patient, Yvonne Day, to the [facility] SPORTS MEDICINE CLINIC [place]. Please see a copy of my visit note below.    Yvonne Day  :  1970  DOS: 2024  MRN: [id number]    Sports Medicine Clinic Procedure    Ultrasound Guided Left Subacromial Injection    Clinical History: Traumatic incomplete tear of the left rotator cuff    Diagnosis: Traumatic incomplete tear of the left rotator cuff    Large Joint Injection/Arthocentesis: L subacromial bursa    Date/Time: 2024 8:13 AM    Performed by: Carroll Cullen DO  Authorized by: Carroll Cullen DO    Indications:  Pain  Needle Size:  22 G  Guidance: ultrasound    Approach:  Anterolateral  Location:  Shoulder      Site:  L subacromial bursa  Medications:  40 mg triamcinolone 40 MG/ML; 2 mL lidocaine 1 %; 2 mL BUPivacaine (PF) 0.5 %  Outcome:  Tolerated well, no immediate complications  Procedure discussed: discussed risks, benefits, and alternatives    Consent Given by:  Patient  Prep: patient was prepped and draped in usual sterile fashion     Ultrasound images of procedure were permanently stored.       Technique: The risks of the procedure were explained to the patient.  A consent was signed for the subacromial injection.  The patient was evaluated with a [employer] ultrasound machine using a 12 MHz linear probe.     Procedure   Subacromial Bursa - Ultrasound Guided  The patient was informed of the risks and the benefits of the procedure and a written consent was signed.  The patient s left shoulder was prepped with chlorhexidine in sterile fashion.   An injectate solution containing 2 mL of 1% lidocaine, 2 mL of 0.5% Bupivacaine, and 1 mL of Kenalog (40 mg/mL) was drawn up into a 5 mL syringe.  Injection was performed using sterile technique.  Under ultrasound guidance a 1.5-inch  22-gauge needle was used to enter the subacromial bursa.  An anterolateral approach was used with arm held in Crass position.  Needle placement was visualized and documented with ultrasound.  Ultrasound visualization was necessary to ensure placement in to the bursa and not the rotator cuff tendon which could potentially cause further tendon damage.  Injection performed in-plane.  Injection solution visualized within the joint space.  Images were permanently stored for the patient's record.  There were no complications. The patient tolerated the procedure well. There was negligible bleeding.       Impression:  Successful ultrasound guided left subacromial bursa injection.    Plan:  - Injection:    - Expectations and goals of the injection were discussed and verbal and written consent was obtained.  - Performed a corticosteroid injection of the left subacromial bursa today in clinic. Patient tolerated the procedure well without complications.    - Post-procedure instructions:    - Keep the injection site clean and dry.   - Do not submerge the injection site for 24 hours (no baths, pools). Showers are ok.   - Rest the area for 24-48 hours before resuming normal activities. Avoid overexerting the area for the first few weeks.   - It may take 2-3 days to start noticing the effects of the injection and up to 3-4 weeks to feel significant benefits.   - Follow up:          - In 3 months as needed for updates to treatment plan, or sooner for new/worsening symptoms.  - Patient has clinic contact information for questions or concerns.      Carroll Cullen DO, ALIZA  [facility] - Sports Medicine  [facility] - Department of Orthopedic Surgery     Disclaimer:  This note was prepared and written using Dragon Medical dictation software. As a result, there may be errors in the script that have gone undetected. Please consider this when interpreting the information in this note.       Again, thank you  for allowing me to participate in the care of your patient.        Sincerely,        Carroll Cullen, DO

## 2024-03-22 ENCOUNTER — THERAPY VISIT (OUTPATIENT)
Dept: PHYSICAL THERAPY | Facility: CLINIC | Age: 54
End: 2024-03-22
Attending: STUDENT IN AN ORGANIZED HEALTH CARE EDUCATION/TRAINING PROGRAM
Payer: COMMERCIAL

## 2024-03-22 DIAGNOSIS — G89.29 CHRONIC LEFT SHOULDER PAIN: Primary | ICD-10-CM

## 2024-03-22 DIAGNOSIS — M25.512 CHRONIC LEFT SHOULDER PAIN: Primary | ICD-10-CM

## 2024-03-22 DIAGNOSIS — S46.012A TRAUMATIC INCOMPLETE TEAR OF LEFT ROTATOR CUFF, INITIAL ENCOUNTER: ICD-10-CM

## 2024-03-22 PROCEDURE — 97161 PT EVAL LOW COMPLEX 20 MIN: CPT | Mod: GP

## 2024-03-22 PROCEDURE — 97110 THERAPEUTIC EXERCISES: CPT | Mod: GP

## 2024-03-22 PROCEDURE — 97140 MANUAL THERAPY 1/> REGIONS: CPT | Mod: GP

## 2024-03-22 NOTE — PROGRESS NOTES
PHYSICAL THERAPY EVALUATION  Type of Visit: Evaluation    See electronic medical record for Abuse and Falls Screening details.  Will screen at next visit.    Subjective       Presenting condition or subjective complaint:  He injured his left shoulder starting a lawnmower. He had an injection that helped the pain for a little bit but now the pain is back. He has pain all the time now and only gets relief with sleeping with his arm up by his head.   Date of onset: 02/13/24    Relevant medical history:     Dates & types of surgery:      Prior diagnostic imaging/testing results:       Prior therapy history for the same diagnosis, illness or injury:        Prior Level of Function  Transfers: Independent  Ambulation: Independent  ADL: Independent      Living Environment  Social support:     Type of home:     Stairs to enter the home:         Ramp:     Stairs inside the home:         Help at home:    Equipment owned:       Employment:      Hobbies/Interests:      Patient goals for therapy:      Pain assessment: Pain present     Objective   SHOULDER EVALUATION  PAIN: Pain Level at Rest: 6/10  Pain Level with Use: 8/10  Pain Location: cervical spine and shoulder  INTEGUMENTARY (edema, incisions): WNL  POSTURE:  L shoulder higher than R with sitting  GAIT:   Weightbearing Status:   Assistive Device(s):   Gait Deviations:   BALANCE/PROPRIOCEPTION:   WEIGHTBEARING ALIGNMENT:   ROM:   (Degrees) Left AROM Left PROM Right AROM  Right PROM   Shoulder Flexion 115  170    Shoulder Extension       Shoulder Abduction 120  170    Shoulder Adduction       Shoulder Internal Rotation T10  T6    Shoulder External Rotation 60  80    Shoulder Horizontal Abduction       Shoulder Horizontal Adduction       Shoulder Flexion ER       Shoulder Flexion IR       Elbow Extension       Elbow Flexion       Pain:   End feel:     STRENGTH:   Pain: - none + mild ++ moderate +++ severe  Strength Scale: 0-5/5 Left Right   Shoulder Flexion 4+ 5   Shoulder  Extension     Shoulder Abduction 4 5   Shoulder Adduction     Shoulder Internal Rotation 4+ 5   Shoulder External Rotation 5 5   Shoulder Horizontal Abduction     Shoulder Horizontal Adduction     Elbow Flexion 5+ 5   Elbow Extension 5 5   Mid Trap     Lower Trap     Rhomboid     Serratus Anterior       FLEXIBILITY:   SPECIAL TESTS:  Cervical tension tests: median N + pain with release, radial N -, ulnar N - , CRLF L +  PALPATION: WNL  JOINT MOBILITY:  hypomobile in posterior GH glides , open T6 on R, open C5 on R,   CERVICAL SCREEN:  cervical ROM WNL, seated cervical retractions made pain worse    Assessment & Plan   CLINICAL IMPRESSIONS  Medical Diagnosis: Traumatic incomplete tear of L rotator cuff    Treatment Diagnosis: L shoulder pain   Impression/Assessment: Patient is a 53 year old male with L shoulder pain with traumatic tear of L rotator cuff complaints.  The following significant findings have been identified: Pain, Decreased ROM/flexibility, Decreased joint mobility, and Decreased strength. These impairments interfere with their ability to perform self care tasks, recreational activities, and household chores as compared to previous level of function.     Clinical Decision Making (Complexity):  Clinical Presentation: Stable/Uncomplicated  Clinical Presentation Rationale: based on medical and personal factors listed in PT evaluation  Clinical Decision Making (Complexity): Low complexity    PLAN OF CARE  Treatment Interventions:  Interventions: Manual Therapy, Neuromuscular Re-education, Therapeutic Activity, Therapeutic Exercise    Long Term Goals     PT Goal 1  Goal Identifier: sleep  Goal Description: Pt will be able to sleep through the night 6/7 nights without being awoken by shoulder pain  Rationale: to maximize safety and independence with performance of ADLs and functional tasks  Target Date: 05/17/24  PT Goal 2  Goal Identifier: lifting  Goal Description: Pt will be able to lift 5#  overhead x10  reps with 3/10 pain  Rationale: to maximize safety and independence with performance of ADLs and functional tasks  Target Date: 06/14/24      Frequency of Treatment: 1x/week  Duration of Treatment: 12 weeks    Recommended Referrals to Other Professionals:   Education Assessment:   Learner/Method: Patient;Listening;Reading;Demonstration;Pictures/Video;No Barriers to Learning  Education Comments: Educated pt on POC and HEP, monitoring centraliztion of symptoms with repeated motions. English is 2nd language.    Risks and benefits of evaluation/treatment have been explained.   Patient/Family/caregiver agrees with Plan of Care.     Evaluation Time:     PT Eval, Low Complexity Minutes (17071): 15       Signing Clinician: Eusebio Irving, PT      [facility]                                                                                   OUTPATIENT PHYSICAL THERAPY      PLAN OF TREATMENT FOR OUTPATIENT REHABILITATION   Patient's Last Name, First Name, aHns Whipplechiqui    YOB: 1970   Provider's Name   [facility]   Medical Record No.  [id number]     Onset Date: 02/13/24  Start of Care Date: 03/22/24     Medical Diagnosis:  Traumatic incomplete tear of L rotator cuff      PT Treatment Diagnosis:  L shoulder pain Plan of Treatment  Frequency/Duration: 1x/week/ 12 weeks    Certification date from 03/22/24 to 06/14/24         See note for plan of treatment details and functional goals     Eusebio Irving, PT. Susana Ivey, PEEWEE                        I CERTIFY THE NEED FOR THESE SERVICES FURNISHED UNDER        THIS PLAN OF TREATMENT AND WHILE UNDER MY CARE     (Physician attestation of this document indicates review and certification of the therapy plan).              Referring Provider:  Carroll Cullen    Initial Assessment  See Epic Evaluation- Start of Care Date: 03/22/24

## 2024-04-02 ENCOUNTER — THERAPY VISIT (OUTPATIENT)
Dept: PHYSICAL THERAPY | Facility: CLINIC | Age: 54
End: 2024-04-02
Payer: COMMERCIAL

## 2024-04-02 DIAGNOSIS — G89.29 CHRONIC LEFT SHOULDER PAIN: Primary | ICD-10-CM

## 2024-04-02 DIAGNOSIS — M25.512 CHRONIC LEFT SHOULDER PAIN: Primary | ICD-10-CM

## 2024-04-02 PROCEDURE — 97110 THERAPEUTIC EXERCISES: CPT | Mod: GP

## 2024-04-12 ENCOUNTER — THERAPY VISIT (OUTPATIENT)
Dept: PHYSICAL THERAPY | Facility: CLINIC | Age: 54
End: 2024-04-12
Payer: COMMERCIAL

## 2024-04-12 DIAGNOSIS — M25.512 CHRONIC LEFT SHOULDER PAIN: Primary | ICD-10-CM

## 2024-04-12 DIAGNOSIS — G89.29 CHRONIC LEFT SHOULDER PAIN: Primary | ICD-10-CM

## 2024-04-12 PROCEDURE — 97110 THERAPEUTIC EXERCISES: CPT | Mod: GP

## 2024-04-12 NOTE — PROGRESS NOTES
"   04/12/24 0500   Appointment Info   Signing clinician's name / credentials Susana Ivey SPT, Eusebio Maria Fernanda DPT, OCS   Total/Authorized Visits 12   Visits Used 3   Medical Diagnosis Traumatic incomplete tear of L rotator cuff   PT Tx Diagnosis L shoulder pain   Other pertinent information pronouced: \"Pra-michael-pop\"   Quick Adds Student Supervision;Certification   Progress Note/Certification   Start of Care Date 03/22/24   Onset of illness/injury or Date of Surgery 02/13/24   Therapy Frequency 1x/week   Predicted Duration 12 weeks   Certification date from 03/22/24   Certification date to 06/14/24   Progress Note Due Date 06/14/24   Progress Note Completed Date 03/22/24   Supervision   Student Supervision Therapy services provided with the co-signing licensed therapist guiding and directing the services, and providing the skilled judgement and assessment throughout the session   PT Goal 1   Goal Identifier sleep   Goal Description Pt will be able to sleep through the night 6/7 nights without being awoken by shoulder pain   Rationale to maximize safety and independence with performance of ADLs and functional tasks   Goal Progress still painful to lay on shoulder and causes sleep disturbances   Target Date 05/17/24   PT Goal 2   Goal Identifier lifting   Goal Description Pt will be able to lift 5#  overhead x10 reps with 3/10 pain   Rationale to maximize safety and independence with performance of ADLs and functional tasks   Goal Progress unable to reach arm above head without pain AG   Target Date 06/14/24   Subjective Report   Subjective Report Pt reports continuting to have shoulder pain with sleeping and lifting his arm up. He does not feel like it has gotten much better. He is leaving next week to [facility] for a few months to stay with family.   Objective Measure 1   Objective Measure shoulder ROM   Details L: [id number],abduction 120, ER 60, IR T10   Therapeutic Procedure/Exercise   Therapeutic Procedures: " strength, endurance, ROM, flexibility minutes (50908) 40   Ther Proc 1 pulley flexion/abd   Ther Proc 1 - Details x2 min each   Ther Proc 2 finger ladder   Ther Proc 2 - Details x5 - increased   PTRx Ther Proc 1 Cervical Retraction   PTRx Ther Proc 1 - Details x10 supine   PTRx Ther Proc 2 prone scapular retration   PTRx Ther Proc 2 - Details 2x10 with PT guidance   PTRx Ther Proc 3 wand flexion   PTRx Ther Proc 3 - Details x10   PTRx Ther Proc 4 wand abduction   PTRx Ther Proc 4 - Details x10   PTRx Ther Proc 5 seated scapular retraction   PTRx Ther Proc 5 - Details x10 with PT assistance   Skilled Intervention monitored patient sympoms and altered exercises to patient comfort. Provided verbal and tactile cues   Patient Response/Progress pt had increased ROM with PROM and AAROM of shoulder with 3/10 pain. Flexion and abduction 140   Education   Learner/Method Patient;Listening;Reading;Demonstration;Pictures/Video;No Barriers to Learning   Education Comments Educated pt on POC and HEP, monitoring centraliztion of symptoms with repeated motions. English is 2nd language.   Plan   Home program PTRx   Updates to plan of care prone scapular retraction and AAROM with wand/pulley   Plan for next session progress strength as tolerated   Total Session Time   Timed Code Treatment Minutes 40   Total Treatment Time (sum of timed and untimed services) 40         DISCHARGE  Reason for Discharge: Pt is going to help [place] for next three months. Pt to continue with therapy when he is away. Will follow up with him if he has pain when he returns.    Equipment Issued:     Discharge Plan: Patient to continue home program.    Referring Provider:  Carroll Cullen

## 2024-09-13 ENCOUNTER — OFFICE VISIT (OUTPATIENT)
Dept: FAMILY MEDICINE | Facility: OTHER | Age: 54
End: 2024-09-13
Payer: COMMERCIAL

## 2024-09-13 VITALS
SYSTOLIC BLOOD PRESSURE: 122 MMHG | RESPIRATION RATE: 16 BRPM | HEART RATE: 73 BPM | BODY MASS INDEX: 29.27 KG/M2 | WEIGHT: 186.5 LBS | TEMPERATURE: 98.1 F | HEIGHT: 67 IN | OXYGEN SATURATION: 95 % | DIASTOLIC BLOOD PRESSURE: 80 MMHG

## 2024-09-13 DIAGNOSIS — S46.002D INJURY OF LEFT ROTATOR CUFF, SUBSEQUENT ENCOUNTER: ICD-10-CM

## 2024-09-13 DIAGNOSIS — M25.512 CHRONIC LEFT SHOULDER PAIN: Primary | ICD-10-CM

## 2024-09-13 DIAGNOSIS — G89.29 CHRONIC LEFT SHOULDER PAIN: Primary | ICD-10-CM

## 2024-09-13 PROCEDURE — 99214 OFFICE O/P EST MOD 30 MIN: CPT | Performed by: PHYSICIAN ASSISTANT

## 2024-09-13 ASSESSMENT — PAIN SCALES - PAIN ENJOYMENT GENERAL ACTIVITY SCALE (PEG)
AVG_PAIN_PASTWEEK: 6
INTERFERED_GENERAL_ACTIVITY: 7
INTERFERED_ENJOYMENT_LIFE: 7
PEG_TOTALSCORE: 6.67
PEG_TOTALSCORE: 6.67

## 2024-09-13 ASSESSMENT — PATIENT HEALTH QUESTIONNAIRE - PHQ9
10. IF YOU CHECKED OFF ANY PROBLEMS, HOW DIFFICULT HAVE THESE PROBLEMS MADE IT FOR YOU TO DO YOUR WORK, TAKE CARE OF THINGS AT HOME, OR GET ALONG WITH OTHER PEOPLE: SOMEWHAT DIFFICULT
SUM OF ALL RESPONSES TO PHQ QUESTIONS 1-9: 0
SUM OF ALL RESPONSES TO PHQ QUESTIONS 1-9: 0

## 2024-09-13 ASSESSMENT — ANXIETY QUESTIONNAIRES
GAD7 TOTAL SCORE: 0
8. IF YOU CHECKED OFF ANY PROBLEMS, HOW DIFFICULT HAVE THESE MADE IT FOR YOU TO DO YOUR WORK, TAKE CARE OF THINGS AT HOME, OR GET ALONG WITH OTHER PEOPLE?: NOT DIFFICULT AT ALL
7. FEELING AFRAID AS IF SOMETHING AWFUL MIGHT HAPPEN: NOT AT ALL

## 2024-09-13 NOTE — PROGRESS NOTES
"  Assessment & Plan     Chronic left shoulder pain  Injury of left rotator cuff, subsequent encounter  Patient is a 54 year old male who presents with concerns about rotator cuff injury to the left shoulder. He had been seen by our orthopedic team this past Feb 2024 and received an injection in the shoulder. He also meet with PT over March/April 2024. Despite these treatments he has continued to experience pain in the shoulder with reaching, overhead and push/pull movements. He also says that the pain is worse when laying on the shoulder. On exam pain noted with AROM abd/flex/int rotation and worse with resistance. Pain noted to be over the regions of the supraspinatus and teres minor. Left UE strength 3-4/5 compared to right. As patient has undergone injection and worked with PT I will have him complete MRI. Pending this can return to orthopedics to discuss treatment options. I did offer mobic or similar pain medication, but patient declined.   - MR Shoulder Left w/o Contrast; Future    BMI  Estimated body mass index is 29.41 kg/m  as calculated from the following:    Height as of this encounter: 1.696 m (5' 6.77\").    Weight as of this encounter: 84.6 kg (186 lb 8 oz).   Weight management plan: Discussed healthy diet and exercise guidelines    Carol Russell is a 54 year old, presenting for the following health issues:  Shoulder Pain      9/13/2024     3:05 PM   Additional Questions   Roomed by Beatrice CARRIZALES   Accompanied by self     History of Present Illness       Reason for visit:  Shoulder pain  Symptom onset:  More than a month  Symptoms include:  Shoulder pain  Symptom progression:  Staying the same  What makes it worse:  Lifting - sometimes but better sometimes, so certain activities  What makes it better:  Sometimes certain activites that make it worse also make it better   He is taking medications regularly.     When laying down and shoulder blade touches when he lays down/sleeping and sometimes lifting " "arm he says sounds like his bones are clicking and he can feel pain when moving his arm.    Pain History:  When did you first notice your pain? Last fall   Have you seen this provider for your pain in the past? Yes   Where in your body do you have pain? Shoulder pain; Left  Are you seeing anyone else for your pain? No , used to see Chiro just one time but not anymore.        9/13/2024     2:46 PM   PHQ-9 SCORE   PHQ-9 Total Score MyChart 0   PHQ-9 Total Score 0           9/13/2024     2:59 PM   DANIELA-7 SCORE   Total Score 0 (minimal anxiety)   Total Score 0           9/13/2024     3:10 PM   PEG Score   PEG Total Score 6.67         Review of Systems  Constitutional, HEENT, cardiovascular, pulmonary, gi and gu systems are negative, except as otherwise noted.      Objective    /80   Pulse 73   Temp 98.1  F (36.7  C) (Temporal)   Resp 16   Ht 1.696 m (5' 6.77\")   Wt 84.6 kg (186 lb 8 oz)   SpO2 95%   BMI 29.41 kg/m    Body mass index is 29.41 kg/m .  Physical Exam   GENERAL: alert and no distress  RESP: lungs clear to auscultation - no rales, rhonchi or wheezes  CV: regular rate and rhythm, normal S1 S2, no S3 or S4, no murmur, click or rub, no peripheral edema  MS: Normal AROM of shoulders. Pain noted in left shoulder during flexion, abduction and internal rotation. Pain was increased during resisted ROM. 4/5 strength to resisted flexion, abd and Int rotation in left shoulder compared to right. Tenderness/pain located in the region of the supraspinatus and teres minor  PSYCH: mentation appears normal, affect normal/bright            Signed Electronically by: Carroll Fernandez PA-C    "

## 2024-10-09 ENCOUNTER — ANCILLARY PROCEDURE (OUTPATIENT)
Dept: MRI IMAGING | Facility: CLINIC | Age: 54
End: 2024-10-09
Attending: PHYSICIAN ASSISTANT
Payer: COMMERCIAL

## 2024-10-09 DIAGNOSIS — M25.512 CHRONIC LEFT SHOULDER PAIN: ICD-10-CM

## 2024-10-09 DIAGNOSIS — S46.002D INJURY OF LEFT ROTATOR CUFF, SUBSEQUENT ENCOUNTER: ICD-10-CM

## 2024-10-09 DIAGNOSIS — G89.29 CHRONIC LEFT SHOULDER PAIN: ICD-10-CM

## 2024-10-09 PROCEDURE — 73221 MRI JOINT UPR EXTREM W/O DYE: CPT | Mod: LT | Performed by: RADIOLOGY

## 2024-10-10 DIAGNOSIS — G89.29 CHRONIC LEFT SHOULDER PAIN: Primary | ICD-10-CM

## 2024-10-10 DIAGNOSIS — M25.512 CHRONIC LEFT SHOULDER PAIN: Primary | ICD-10-CM

## 2024-10-10 NOTE — RESULT ENCOUNTER NOTE
MR returned with no full thickness tears, but did show some fraying and partial tears. I would like the patient to return to the orthopedic providers to discuss options for treatment. I will put in a referral for him, he can expect a call tomorrow or Monday to schedule.    Carroll Fernandez PA-C on 10/10/2024 at 2:33 PM

## 2024-10-11 ENCOUNTER — TELEPHONE (OUTPATIENT)
Dept: FAMILY MEDICINE | Facility: OTHER | Age: 54
End: 2024-10-11
Payer: COMMERCIAL

## 2024-10-11 NOTE — TELEPHONE ENCOUNTER
----- Message from Carroll Fernandez sent at 10/10/2024  2:34 PM CDT -----  MR returned with no full thickness tears, but did show some fraying and partial tears. I would like the patient to return to the orthopedic providers to discuss options for treatment. I will put in a referral for him, he can expect a call tomorrow or Monday to schedule.    Carroll Fernandez PA-C on 10/10/2024 at 2:33 PM

## 2024-10-11 NOTE — TELEPHONE ENCOUNTER
Patient returned call with  on the line. Writer read result note to patient and provided him with the phone number from the orthopedic referral so he can call and schedule if he wishes. He had no further questions or concerns.     ETHEL RicoN, RN

## 2024-10-14 ENCOUNTER — OFFICE VISIT (OUTPATIENT)
Dept: ORTHOPEDICS | Facility: CLINIC | Age: 54
End: 2024-10-14
Attending: PHYSICIAN ASSISTANT
Payer: COMMERCIAL

## 2024-10-14 DIAGNOSIS — M75.02 ADHESIVE CAPSULITIS OF LEFT SHOULDER: Primary | ICD-10-CM

## 2024-10-14 DIAGNOSIS — G89.29 CHRONIC LEFT SHOULDER PAIN: ICD-10-CM

## 2024-10-14 DIAGNOSIS — M25.512 CHRONIC LEFT SHOULDER PAIN: ICD-10-CM

## 2024-10-14 DIAGNOSIS — M75.112 NONTRAUMATIC INCOMPLETE TEAR OF LEFT ROTATOR CUFF: ICD-10-CM

## 2024-10-14 DIAGNOSIS — M19.012 ARTHRITIS OF LEFT ACROMIOCLAVICULAR JOINT: ICD-10-CM

## 2024-10-14 PROCEDURE — 99214 OFFICE O/P EST MOD 30 MIN: CPT | Performed by: PEDIATRICS

## 2024-10-14 NOTE — LETTER
10/14/2024      Yvonne Day  [address] [facility] [address]      Dear Colleague,    Thank you for referring your patient, Yvonne Day, to the [facility]. Please see a copy of my visit note below.    ASSESSMENT & PLAN    Yvonne was seen today for pain.    Diagnoses and all orders for this visit:    Adhesive capsulitis of left shoulder  -     Orthopedic  Referral; Future  -     Physical Therapy  Referral; Future    Chronic left shoulder pain  -     Orthopedic  Referral  -     Orthopedic  Referral; Future  -     Physical Therapy  Referral; Future    Nontraumatic incomplete tear of left rotator cuff  -     Orthopedic  Referral; Future  -     Physical Therapy  Referral; Future    Arthritis of left acromioclavicular joint  -     Orthopedic  Referral; Future  -     Physical Therapy  Referral; Future      This issue is chronic and Unchanged.      ICD-10-CM    1. Adhesive capsulitis of left shoulder  M75.02 Orthopedic  Referral     Physical Therapy  Referral      2. Chronic left shoulder pain  M25.512 Orthopedic  Referral    G89.29 Orthopedic  Referral     Physical Therapy  Referral      3. Nontraumatic incomplete tear of left rotator cuff  M75.112 Orthopedic  Referral     Physical Therapy  Referral      4. Arthritis of left acromioclavicular joint  M19.012 Orthopedic  Referral     Physical Therapy  Referral        Patient Instructions   We discussed these other possible diagnosis: Chronic left shoulder pain persisting after subacromial injection and physical therapy. Today's exam more consistent with adhesive capsulitis, discussed additional glenohumeral joint injection and physical therapy.    Plan:  - Today's Plan of Care:  Referral for US guided left shoulder injection with Dr. Cullen  [facility]  [phone]    Discussed activity considerations and other supportive care including Ice/Heat, OTC and other topical medications as needed.    -We also discussed other future treatment options:  Referral to Orthopedic Surgery    Follow Up: 6 - 8 weeks with myself or Dr. Cullen    Concerning signs and symptoms were reviewed and all questions were answered at this time.    Thalia Savage MD [facility]  Sports Medicine Physician  [facility]    -----  Chief Complaint   Patient presents with     Left Shoulder - Pain       SUBJECTIVE  Yvonne Day is a/an 54 year old male who is seen in consultation at the request of  Carroll Fernandez PA-C for evaluation of left shoulder pain, follow up from Dr. Cullen.  - Previously saw Dr. Cullen 2/13/24, also saw Alyse for a L shoulder USG SA bursa injection on 2/16/24.      The patient is seen with a friend who is acting as an .  The patient is right handed    Onset: 10 month(s) ago. Patient describes injury as initially pull starting a .  Improved over the last month.   Location of Pain: left shoulder; RTC, AC joint   Worsened by: abduction, flexion, no heavy lifting overhead,   Better with: home exercises that he found on his own   Treatments tried: previous imaging (xray 1/26/24, MRI ) and corticosteroid injection (most recent date: 2/16/24, subacromial) that provided no relief  Associated symptoms: weakness of left shoulder and pain, physical therapy (3 visits), HEP   - Did not improve with injection and PT so PCP ordered an MRI.    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   General: healthy, alert and in no distress  Skin: no suspicious lesions or rash.  CV: distal perfusion intact   Resp: normal respiratory effort without  conversational dyspnea   Psych: normal mood and affect  Gait:   Neuro: Normal light sensory exam of upper extremity    Bilateral Shoulder exam    Inspection and Posture:       normal    Skin:        no visible deformities    Tender:        acromioclavicular joint left       upper trapezius left    Non Tender:       remainder of shoulder bilateral    ROM:        forward flexion 160  left       abduction 150 left       internal rotation gluteal left       external rotation 35 left       asymmetric scapular motion    Painful motions:       flexion left       elevation left    Strength:        abduction 5/5 bilateral       flexion 5/5 bilateral       internal rotation 5/5 bilateral       external rotation 5/5 bilateral    Impingement testing:       positive (+) Neer left       positive (+) Ray left    Sensation:        normal sensation over shoulder and upper extremity     RADIOLOGY:  Final results and radiologist's interpretation, available in the [facility] health record.  Images were reviewed with the patient in the office today.  My personal interpretation of the performed imaging:     3 XR views of left shoulder reviewed: no acute bony abnormality, AC joint degenerative change  - will follow official read    Reviewed prior MRI left shoulder 10/9/2024 -moderate AC joint arthritis, extensive subchondral bone marrow edema, no full-thickness rotator cuff tear, there is low-grade partial-thickness tearing, degenerative labral tearing    Results for orders placed or performed in visit on 10/09/24   MR Shoulder Left w/o Contrast    Narrative    Exam: MRI of the left shoulder dated 10/9/2024.    COMPARISON: Radiograph dated 1/26/2024.    CLINICAL HISTORY: Chronic shoulder pain.    TECHNIQUE: Multiplanar, multisequence MR imaging of the left shoulder  was obtained using standard sequences in 3 orthogonal planes without  the use of intravenous or intra-articular gadolinium contrast.    FINDINGS:    Minimal fluid in the left  shoulder glenohumeral joint. Trace fluid in  the subacromial subdeltoid bursa.    No marrow signal abnormalities to suggest fracture or marrow  infiltration.    At least moderate osteoarthrosis at the acromioclavicular joint. Bone  marrow edema in the distal clavicle and acromion with subchondral  cystic changes. No os acromiale. The coracohumeral, coracoclavicular,  coracoacromial ligaments are intact. Preserved subcoracoid fat.    The supraspinatus, infraspinatus, teres minor, and subscapularis  tendons are intact without full-thickness tear or tendon retraction.  Low grade partial thickness tearing of the most anterior fibers of the  supraspinatus tendon at the footprint.    Mild fatty infiltration within the teres minor muscle, otherwise the  rotator cuff musculature is intact without significant atrophy or soft  tissue edema.    The biceps tendon is normal within the bicipital groove. The  intra-articular biceps tendon is intact.    The humeral head is well aligned of the glenoid. No Hill-Sachs lesion.  No full-thickness cartilage loss. Degenerative fraying of the superior  labrum as it extends posteriorly.      Impression    IMPRESSION:  1. At least moderate osteoarthrosis at the left shoulder  acromioclavicular joint. There is extensive subchondral bone marrow  edema in both the distal clavicle and acromion with subchondral cystic  changes, correlate clinically.    2. No full-thickness tear or tendon retraction involving the left  shoulder rotator cuff tendons. There is low-grade articular sided  partial thickness tearing of the most anterior fibers of the  supraspinatus tendon at the footprint.    3. Mild fatty infiltration within the teres minor muscle, otherwise  the remaining rotator cuff musculature and deltoid muscle are intact.    4. Normal-appearing biceps tendon.    5. Mild fraying of the superior labrum, presumably degenerative.    ISHAAN VOGT MD         SYSTEM ID:  [id number]     EXAM: XR  SHOULDER LEFT G/E 3 VIEWS  [facility]  DATE: 1/26/2024  INDICATION: left shoulder pain x 2 months after a pulling mechanism injury   pulling lawnmower on. No point tenderness.  COMPARISON: None.                                                     IMPRESSION: Normal joint spaces and alignment. No fracture      Review of the result(s) of each unique test - XR             Again, thank you for allowing me to participate in the care of your patient.        Sincerely,        Thalia Savage MD

## 2024-10-14 NOTE — PROGRESS NOTES
ASSESSMENT & PLAN    Yvonne was seen today for pain.    Diagnoses and all orders for this visit:    Adhesive capsulitis of left shoulder  -     Orthopedic  Referral; Future  -     Physical Therapy  Referral; Future    Chronic left shoulder pain  -     Orthopedic  Referral  -     Orthopedic  Referral; Future  -     Physical Therapy  Referral; Future    Nontraumatic incomplete tear of left rotator cuff  -     Orthopedic  Referral; Future  -     Physical Therapy  Referral; Future    Arthritis of left acromioclavicular joint  -     Orthopedic  Referral; Future  -     Physical Therapy  Referral; Future      This issue is chronic and Unchanged.      ICD-10-CM    1. Adhesive capsulitis of left shoulder  M75.02 Orthopedic  Referral     Physical Therapy  Referral      2. Chronic left shoulder pain  M25.512 Orthopedic  Referral    G89.29 Orthopedic  Referral     Physical Therapy  Referral      3. Nontraumatic incomplete tear of left rotator cuff  M75.112 Orthopedic  Referral     Physical Therapy  Referral      4. Arthritis of left acromioclavicular joint  M19.012 Orthopedic  Referral     Physical Therapy  Referral        Patient Instructions   We discussed these other possible diagnosis: Chronic left shoulder pain persisting after subacromial injection and physical therapy. Today's exam more consistent with adhesive capsulitis, discussed additional glenohumeral joint injection and physical therapy.    Plan:  - Today's Plan of Care:  Referral for US guided left shoulder injection with Dr. Cullen  Rehab: Physical Therapy: [facility] - [phone]    Discussed activity considerations and other supportive care including Ice/Heat, OTC and other topical medications as needed.    -We also discussed other future treatment options:  Referral to Orthopedic  Surgery    Follow Up: 6 - 8 weeks with myself or Dr. Cullen    Concerning signs and symptoms were reviewed and all questions were answered at this time.    Thalia Savage MD [facility]  Sports Medicine Physician  [facility]    -----  Chief Complaint   Patient presents with    Left Shoulder - Pain       SUBJECTIVE  Yvonne Day is a/an 54 year old male who is seen in consultation at the request of  Carroll Fernandez PA-C for evaluation of left shoulder pain, follow up from Dr. Cullen.  - Previously saw Dr. Cullen 2/13/24, also saw Alyse for a L shoulder USG SA bursa injection on 2/16/24.      The patient is seen with a friend who is acting as an .  The patient is right handed    Onset: 10 month(s) ago. Patient describes injury as initially pull starting a .  Improved over the last month.   Location of Pain: left shoulder; RTC, AC joint   Worsened by: abduction, flexion, no heavy lifting overhead,   Better with: home exercises that he found on his own   Treatments tried: previous imaging (xray 1/26/24, MRI ) and corticosteroid injection (most recent date: 2/16/24, subacromial) that provided no relief  Associated symptoms: weakness of left shoulder and pain, physical therapy (3 visits), HEP   - Did not improve with injection and PT so PCP ordered an MRI.    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   General: healthy, alert and in no distress  Skin: no suspicious lesions or rash.  CV: distal perfusion intact   Resp: normal respiratory effort without conversational dyspnea   Psych: normal mood and affect  Gait:   Neuro: Normal light sensory exam of upper extremity    Bilateral Shoulder exam    Inspection and Posture:       normal    Skin:        no visible deformities    Tender:        acromioclavicular joint  left       upper trapezius left    Non Tender:       remainder of shoulder bilateral    ROM:        forward flexion 160  left       abduction 150 left       internal rotation gluteal left       external rotation 35 left       asymmetric scapular motion    Painful motions:       flexion left       elevation left    Strength:        abduction 5/5 bilateral       flexion 5/5 bilateral       internal rotation 5/5 bilateral       external rotation 5/5 bilateral    Impingement testing:       positive (+) Neer left       positive (+) Ray left    Sensation:        normal sensation over shoulder and upper extremity     RADIOLOGY:  Final results and radiologist's interpretation, available in the [facility] health record.  Images were reviewed with the patient in the office today.  My personal interpretation of the performed imaging:     3 XR views of left shoulder reviewed: no acute bony abnormality, AC joint degenerative change  - will follow official read    Reviewed prior MRI left shoulder 10/9/2024 -moderate AC joint arthritis, extensive subchondral bone marrow edema, no full-thickness rotator cuff tear, there is low-grade partial-thickness tearing, degenerative labral tearing    Results for orders placed or performed in visit on 10/09/24   MR Shoulder Left w/o Contrast    Narrative    Exam: MRI of the left shoulder dated 10/9/2024.    COMPARISON: Radiograph dated 1/26/2024.    CLINICAL HISTORY: Chronic shoulder pain.    TECHNIQUE: Multiplanar, multisequence MR imaging of the left shoulder  was obtained using standard sequences in 3 orthogonal planes without  the use of intravenous or intra-articular gadolinium contrast.    FINDINGS:    Minimal fluid in the left shoulder glenohumeral joint. Trace fluid in  the subacromial subdeltoid bursa.    No marrow signal abnormalities to suggest fracture or marrow  infiltration.    At least moderate osteoarthrosis at the acromioclavicular joint. Bone  marrow edema in the distal  clavicle and acromion with subchondral  cystic changes. No os acromiale. The coracohumeral, coracoclavicular,  coracoacromial ligaments are intact. Preserved subcoracoid fat.    The supraspinatus, infraspinatus, teres minor, and subscapularis  tendons are intact without full-thickness tear or tendon retraction.  Low grade partial thickness tearing of the most anterior fibers of the  supraspinatus tendon at the footprint.    Mild fatty infiltration within the teres minor muscle, otherwise the  rotator cuff musculature is intact without significant atrophy or soft  tissue edema.    The biceps tendon is normal within the bicipital groove. The  intra-articular biceps tendon is intact.    The humeral head is well aligned of the glenoid. No Hill-Sachs lesion.  No full-thickness cartilage loss. Degenerative fraying of the superior  labrum as it extends posteriorly.      Impression    IMPRESSION:  1. At least moderate osteoarthrosis at the left shoulder  acromioclavicular joint. There is extensive subchondral bone marrow  edema in both the distal clavicle and acromion with subchondral cystic  changes, correlate clinically.    2. No full-thickness tear or tendon retraction involving the left  shoulder rotator cuff tendons. There is low-grade articular sided  partial thickness tearing of the most anterior fibers of the  supraspinatus tendon at the footprint.    3. Mild fatty infiltration within the teres minor muscle, otherwise  the remaining rotator cuff musculature and deltoid muscle are intact.    4. Normal-appearing biceps tendon.    5. Mild fraying of the superior labrum, presumably degenerative.    ISHAAN VOGT MD         SYSTEM ID:  [id number]     EXAM: XR SHOULDER LEFT G/E 3 VIEWS  LOCATION: [facility]  DATE: 1/26/2024  INDICATION: left shoulder pain x 2 months after a pulling mechanism injury   pulling lawnmower on. No point tenderness.  COMPARISON: None.                                                      IMPRESSION: Normal joint spaces and alignment. No fracture      Review of the result(s) of each unique test - XR

## 2024-10-14 NOTE — PATIENT INSTRUCTIONS
We discussed these other possible diagnosis: Chronic left shoulder pain persisting after subacromial injection and physical therapy. Today's exam more consistent with adhesive capsulitis, discussed additional glenohumeral joint injection and physical therapy.    Plan:  - Today's Plan of Care:  Referral for US guided left shoulder injection with Dr. Cullen  Rehab: [facility] - [phone]    Discussed activity considerations and other supportive care including Ice/Heat, OTC and other topical medications as needed.    -We also discussed other future treatment options:  Referral to Orthopedic Surgery    Follow Up: 6 - 8 weeks with myself or Dr. Cullen    If you have any further questions for your physician or physician s care team you can call [phone].

## 2024-10-14 NOTE — Clinical Note
PCP ordered MRI, put in another referral so he was scheduled with me.  I think his exam is more frozen-y, so referring back for trial of GH joint injection and additional PT. Happy to see him back but he lives closer to you in [place] so will likely follow up with you. Let me know if questions, thanks!

## 2024-10-31 NOTE — PROGRESS NOTES
Phrasupap Muangprom  :  1970  DOS: 2024  MRN: [id number]    Sports Medicine Clinic Procedure    Ultrasound Guided Left Shoulder Intra-articular Glenohumeral Joint Injection    Clinical History: He has been treated for rotator cuff tendinopathy/impingement and has gotten some relief from subacromial bursa injection in the past, but pain persisted within the deep joint and recently saw Dr. Savage who suspected more adhesive capsulitis and recommended an ultrasound-guided glenohumeral joint injection.  Presents for this injection today.    Diagnosis:   1. Adhesive capsulitis of left shoulder      Referring Physician: Dr. Thalia Savage  Large Joint Injection/Arthocentesis: L glenohumeral joint    Date/Time: 2024 2:51 PM    Performed by: Carroll Cullen DO  Authorized by: Carroll Cullen DO    Indications:  Pain  Needle Size:  22 G  Guidance: ultrasound    Approach:  Posterolateral  Location:  Shoulder      Site:  L glenohumeral joint  Medications:  40 mg triamcinolone 40 MG/ML; 2 mL lidocaine 1 %; 2 mL BUPivacaine 0.5 %  Outcome:  Tolerated well, no immediate complications  Procedure discussed: discussed risks, benefits, and alternatives    Consent Given by:  Patient  Prep: patient was prepped and draped in usual sterile fashion     Ultrasound images of procedure were permanently stored.        Left Glenohumeral Injection - Ultrasound Guided  The patient was informed of the risks and the benefits of the procedure and a written consent was signed.  The patient s shoulder was prepped with chlorhexidine in sterile fashion.   40 mg of kenalog suspension was drawn up into a 5 mL syringe with 2 mL of 1% lidocaine and 2 ml of 0.5% bupivacaine.   Injection was performed using sterile technique.  Under ultrasound guidance a 2-inch 25-gauge needle was used to enter the glenohumeral joint.  Posterolateral approach was used with the patient in lateral recumbent position, arm in neutral position at the side.   Needle placement was visualized and documented with ultrasound.  Ultrasound visualization was necessary due to the small joint space entered.  Injection performed long axis to the probe.  Injection solution visualized within the joint space.  Images were permanently stored for the patient's record.  There were no complications. The patient tolerated the procedure well. There was negligible bleeding.     Impression:  Successful ultrasound-guided left glenohumeral joint corticosteroid injection.    Plan:  - Injection:    - Expectations and goals of the injection were discussed and verbal and written consent was obtained.  - Performed the above procedure today in clinic. Patient tolerated the procedure well without complications.    - Post-procedure instructions:    - Keep the injection site clean and dry.   - Do not submerge the injection site for 24 hours (no baths, pools). Showers are ok.   - Rest the area for 24-48 hours before resuming normal activities. Avoid overexerting the area for the first few weeks.   - It may take 2-3 days to start noticing the effects of the injection and up to 3-4 weeks to feel significant benefits.   - Follow up:          - With Dr. Savage or myself as needed for updates to treatment plan, or sooner for new/worsening symptoms.  - Patient has clinic contact information for questions or concerns.      Carroll Cullen DO, ALIZA  [facility] - Sports Medicine  [facility] - Department of Orthopedic Surgery     Disclaimer:  This note was prepared and written using Dragon Medical dictation software. As a result, there may be errors in the script that have gone undetected. Please consider this when interpreting the information in this note.

## 2024-11-06 ENCOUNTER — OFFICE VISIT (OUTPATIENT)
Dept: ORTHOPEDICS | Facility: CLINIC | Age: 54
End: 2024-11-06
Attending: PEDIATRICS
Payer: COMMERCIAL

## 2024-11-06 DIAGNOSIS — M75.02 ADHESIVE CAPSULITIS OF LEFT SHOULDER: Primary | ICD-10-CM

## 2024-11-06 PROCEDURE — 20611 DRAIN/INJ JOINT/BURSA W/US: CPT | Mod: LT | Performed by: STUDENT IN AN ORGANIZED HEALTH CARE EDUCATION/TRAINING PROGRAM

## 2024-11-06 RX ORDER — BUPIVACAINE HYDROCHLORIDE 5 MG/ML
2 INJECTION, SOLUTION PERINEURAL
Status: COMPLETED | OUTPATIENT
Start: 2024-11-06 | End: 2024-11-06

## 2024-11-06 RX ORDER — LIDOCAINE HYDROCHLORIDE 10 MG/ML
2 INJECTION, SOLUTION INFILTRATION; PERINEURAL
Status: COMPLETED | OUTPATIENT
Start: 2024-11-06 | End: 2024-11-06

## 2024-11-06 RX ORDER — TRIAMCINOLONE ACETONIDE 40 MG/ML
40 INJECTION, SUSPENSION INTRA-ARTICULAR; INTRAMUSCULAR
Status: COMPLETED | OUTPATIENT
Start: 2024-11-06 | End: 2024-11-06

## 2024-11-06 RX ADMIN — TRIAMCINOLONE ACETONIDE 40 MG: 40 INJECTION, SUSPENSION INTRA-ARTICULAR; INTRAMUSCULAR at 14:51

## 2024-11-06 RX ADMIN — LIDOCAINE HYDROCHLORIDE 2 ML: 10 INJECTION, SOLUTION INFILTRATION; PERINEURAL at 14:51

## 2024-11-06 RX ADMIN — BUPIVACAINE HYDROCHLORIDE 2 ML: 5 INJECTION, SOLUTION PERINEURAL at 14:51

## 2024-11-06 NOTE — LETTER
2024      Yvonne Day  [address] [facility] [address]      Dear Colleague,    Thank you for referring your patient, Yvonne Day, to the [facility] [place]. Please see a copy of my visit note below.    Yvonne Day  :  1970  DOS: 2024  MRN: [id number]    Sports Medicine Clinic Procedure    Ultrasound Guided Left Shoulder Intra-articular Glenohumeral Joint Injection    Clinical History: He has been treated for rotator cuff tendinopathy/impingement and has gotten some relief from subacromial bursa injection in the past, but pain persisted within the deep joint and recently saw Dr. Savage who suspected more adhesive capsulitis and recommended an ultrasound-guided glenohumeral joint injection.  Presents for this injection today.    Diagnosis:   1. Adhesive capsulitis of left shoulder      Referring Physician: Dr. Thalia Savage  Large Joint Injection/Arthocentesis: L glenohumeral joint    Date/Time: 2024 2:51 PM    Performed by: Carroll Cullen DO  Authorized by: Carroll Cullen DO    Indications:  Pain  Needle Size:  22 G  Guidance: ultrasound    Approach:  Posterolateral  Location:  Shoulder      Site:  L glenohumeral joint  Medications:  40 mg triamcinolone 40 MG/ML; 2 mL lidocaine 1 %; 2 mL BUPivacaine 0.5 %  Outcome:  Tolerated well, no immediate complications  Procedure discussed: discussed risks, benefits, and alternatives    Consent Given by:  Patient  Prep: patient was prepped and draped in usual sterile fashion     Ultrasound images of procedure were permanently stored.        Left Glenohumeral Injection - Ultrasound Guided  The patient was informed of the risks and the benefits of the procedure and a written consent was signed.  The patient s shoulder was prepped with chlorhexidine in sterile fashion.   40 mg of kenalog suspension was drawn up into a 5 mL syringe with 2 mL of 1% lidocaine and 2 ml of 0.5% bupivacaine.    Injection was performed using sterile technique.  Under ultrasound guidance a 2-inch 25-gauge needle was used to enter the glenohumeral joint.  Posterolateral approach was used with the patient in lateral recumbent position, arm in neutral position at the side.  Needle placement was visualized and documented with ultrasound.  Ultrasound visualization was necessary due to the small joint space entered.  Injection performed long axis to the probe.  Injection solution visualized within the joint space.  Images were permanently stored for the patient's record.  There were no complications. The patient tolerated the procedure well. There was negligible bleeding.     Impression:  Successful ultrasound-guided left glenohumeral joint corticosteroid injection.    Plan:  - Injection:    - Expectations and goals of the injection were discussed and verbal and written consent was obtained.  - Performed the above procedure today in clinic. Patient tolerated the procedure well without complications.    - Post-procedure instructions:    - Keep the injection site clean and dry.   - Do not submerge the injection site for 24 hours (no baths, pools). Showers are ok.   - Rest the area for 24-48 hours before resuming normal activities. Avoid overexerting the area for the first few weeks.   - It may take 2-3 days to start noticing the effects of the injection and up to 3-4 weeks to feel significant benefits.   - Follow up:          - With Dr. Savage or myself as needed for updates to treatment plan, or sooner for new/worsening symptoms.  - Patient has clinic contact information for questions or concerns.      Carroll Cullen DO, CAQSM  [facility] Sports Medicine  [facility] - Department of Orthopedic Surgery     Disclaimer:  This note was prepared and written using Dragon Medical dictation software. As a result, there may be errors in the script that have gone undetected. Please consider this when  interpreting the information in this note.         Again, thank you for allowing me to participate in the care of your patient.        Sincerely,        Carroll Cullen, DO

## 2024-11-08 ENCOUNTER — THERAPY VISIT (OUTPATIENT)
Dept: PHYSICAL THERAPY | Facility: CLINIC | Age: 54
End: 2024-11-08
Attending: PEDIATRICS
Payer: COMMERCIAL

## 2024-11-08 DIAGNOSIS — M25.512 CHRONIC LEFT SHOULDER PAIN: ICD-10-CM

## 2024-11-08 DIAGNOSIS — G89.29 CHRONIC LEFT SHOULDER PAIN: ICD-10-CM

## 2024-11-08 DIAGNOSIS — M75.112 NONTRAUMATIC INCOMPLETE TEAR OF LEFT ROTATOR CUFF: ICD-10-CM

## 2024-11-08 DIAGNOSIS — M75.02 ADHESIVE CAPSULITIS OF LEFT SHOULDER: ICD-10-CM

## 2024-11-08 DIAGNOSIS — M19.012 ARTHRITIS OF LEFT ACROMIOCLAVICULAR JOINT: ICD-10-CM

## 2024-11-08 PROCEDURE — 97110 THERAPEUTIC EXERCISES: CPT | Mod: GP | Performed by: PHYSICAL THERAPIST

## 2024-11-08 PROCEDURE — 97161 PT EVAL LOW COMPLEX 20 MIN: CPT | Mod: GP | Performed by: PHYSICAL THERAPIST

## 2024-11-08 PROCEDURE — 97140 MANUAL THERAPY 1/> REGIONS: CPT | Mod: GP | Performed by: PHYSICAL THERAPIST

## 2024-11-08 NOTE — PROGRESS NOTES
PHYSICAL THERAPY EVALUATION  Type of Visit: Evaluation        Fall Risk Screen:  Fall screen completed by: PT  Have you fallen 2 or more times in the past year?: No  Have you fallen and had an injury in the past year?: No  Is patient a fall risk?: No    Subjective   He has been dealing with pain around his L scapular.   Pain will be worse with lifting heavy objects, raising arm over head, pressure with laying on L side.  He will feel better with rest.  He had an interarticular injection on 11/6/24 with mild relief.  Pt notes he is right handed.        Presenting condition or subjective complaint:    Date of onset: 10/14/24 (MD order)    Relevant medical history:   no significant findings  Dates & types of surgery:  none    Prior diagnostic imaging/testing results:     MRI - mild degeneration of superior labrum, OA of AC joint  Prior therapy history for the same diagnosis, illness or injury:    yes, L shoulder in spring of 2024 with mod relief, stopped due to traveling to [facility] for multiple months.    Prior Level of Function  Transfers: Independent  Ambulation: Independent  ADL: Independent  IADL:     Living Environment  Social support:   family  Type of home:   house  Stairs to enter the home:         Ramp:     Stairs inside the home:         Help at home:  none  Equipment owned:   none    Employment:      Hobbies/Interests:      Patient goals for therapy:   improve pain, be able to work with his L arm, especially over head    Pain assessment: See objective evaluation for additional pain details     Objective   SHOULDER EVALUATION  PAIN: Pain Level at Rest: 3/10  Pain Level with Use: 5/10  Pain Location: L scapular area  Pain Quality: Aching  Pain Frequency: constant  Pain is Worst: related to activity  Pain is Exacerbated By: lifting too much, laying on L shoulder, reaching overhead, use of L arm  Pain is Relieved By: rest  Pain Progression: mildly improved  INTEGUMENTARY (edema, incisions):   POSTURE: Standing  Posture: Rounded shoulders, Forward head  GAIT:   Weightbearing Status:   Assistive Device(s):   Gait Deviations:   BALANCE/PROPRIOCEPTION:   WEIGHTBEARING ALIGNMENT:   ROM:   (Degrees) Left AROM Left PROM Right AROM  Right PROM   Shoulder Flexion 160- pain in side shoulder  wnl    Shoulder Extension       Shoulder Abduction 165 - pain inside shoulder  wnl    Shoulder Adduction       Shoulder Internal Rotation T10 - pain inside shoulder  wnl    Shoulder External Rotation 65 - pain inside shoulder  wnl    Shoulder Horizontal Abduction       Shoulder Horizontal Adduction       Shoulder Flexion ER       Shoulder Flexion IR       Elbow Extension wnl  wnl    Elbow Flexion wnl  wnl    Pain:   End feel:     STRENGTH:   Pain: - none + mild ++ moderate +++ severe  Strength Scale: 0-5/5 Left Right   Shoulder Flexion 4 (+) 5   Shoulder Extension     Shoulder Abduction 4 (+) 5   Shoulder Adduction     Shoulder Internal Rotation 4 (+) 5   Shoulder External Rotation 4 (+) 5   Shoulder Horizontal Abduction     Shoulder Horizontal Adduction     Elbow Flexion 4+ (+) 5   Elbow Extension 5 5   Mid Trap     Lower Trap     Rhomboid     Serratus Anterior       FLEXIBILITY:   SPECIAL TESTS:    Left Right   Impingement     Neer's + in abduction    Hawkin's-Grant + with arm in 90 flex    Coracoid Impingement     Internal impingement +    Labral     Anterior Slide     [place]'s +    Ofe +    Instability     Apprehension (anterior) -    Relocation (anterior)     Anterior Load & Shift     Posterior Load & Shift     Posterior instability (with 90 degrees flex)     Multi-Directional Instability      Sulcus     Biceps      Speed's +    Rotator Cuff Tear     Drop Arm -    Belly Press -    Lift off        PALPATION:  tender along infraspinatus L  JOINT MOBILITY:  mod loss post glide L GHJ, mild loss inf glide L GHJ  CERVICAL SCREEN:     Assessment & Plan   CLINICAL IMPRESSIONS  Medical Diagnosis: L shoulder pain, frozen shoulder, ACJ OA     Treatment Diagnosis: L shoulder pain consistent with labral irritation   Impression/Assessment: Patient is a 54 year old male with L shoulder complaints.  The following significant findings have been identified: Pain, Decreased joint mobility, Decreased strength, Impaired muscle performance, Decreased activity tolerance, and Impaired posture. These impairments interfere with their ability to perform self care tasks, work tasks, recreational activities, household chores, household mobility, and community mobility as compared to previous level of function.     Clinical Decision Making (Complexity):  Clinical Presentation: Stable/Uncomplicated  Clinical Presentation Rationale: based on medical and personal factors listed in PT evaluation  Clinical Decision Making (Complexity): Low complexity    PLAN OF CARE  Treatment Interventions:  Interventions: Manual Therapy, Neuromuscular Re-education, Therapeutic Activity, Therapeutic Exercise    Long Term Goals     PT Goal 1  Goal Description: Pt will be able to lift 2# over head, 10x, pain 3/10  Rationale: to maximize safety and independence with performance of ADLs and functional tasks;to maximize safety and independence within the home;to maximize safety and independence with self cares  Target Date: 12/20/24  PT Goal 2  Goal Description: Pt will be able to lift 5# overhead, 10x, pain 3/10  Rationale: to maximize safety and independence with performance of ADLs and functional tasks;to maximize safety and independence within the home;to maximize safety and independence within the community;to maximize safety and independence with self cares  Target Date: 01/31/25      Frequency of Treatment: 1x/2 weeks  Duration of Treatment: 12 weeks    Recommended Referrals to Other Professionals:   Education Assessment:   Learner/Method: Patient;Pictures/Video;Demonstration  Education Comments: needed [place]     Risks and benefits of evaluation/treatment have been explained.    Patient/Family/caregiver agrees with Plan of Care.     Evaluation Time:     PT Eval, Low Complexity Minutes (78342): 20       Signing Clinician: Eusebio Irving PT        [facility]                                                                                   OUTPATIENT PHYSICAL THERAPY      PLAN OF TREATMENT FOR OUTPATIENT REHABILITATION   Patient's Last Name, First Name, Yvonne Whipple    YOB: 1970   Provider's Name   [facility]   Medical Record No.  [id number]     Onset Date: 10/14/24 (MD order)  Start of Care Date: 11/08/24     Medical Diagnosis:  L shoulder pain, frozen shoulder, ACJ OA      PT Treatment Diagnosis:  L shoulder pain consistent with labral irritation Plan of Treatment  Frequency/Duration: 1x/2 weeks/ 12 weeks    Certification date from 11/08/24 to 01/31/25         See note for plan of treatment details and functional goals     Eusebio Irving, PT                         I CERTIFY THE NEED FOR THESE SERVICES FURNISHED UNDER        THIS PLAN OF TREATMENT AND WHILE UNDER MY CARE     (Physician attestation of this document indicates review and certification of the therapy plan).              Referring Provider:  Thalia Savage    Initial Assessment  See Epic Evaluation- Start of Care Date: 11/08/24